# Patient Record
Sex: MALE | HISPANIC OR LATINO | Employment: FULL TIME | URBAN - METROPOLITAN AREA
[De-identification: names, ages, dates, MRNs, and addresses within clinical notes are randomized per-mention and may not be internally consistent; named-entity substitution may affect disease eponyms.]

---

## 2022-06-28 ENCOUNTER — HOSPITAL ENCOUNTER (INPATIENT)
Facility: MEDICAL CENTER | Age: 32
LOS: 4 days | DRG: 027 | End: 2022-07-03
Attending: EMERGENCY MEDICINE | Admitting: SURGERY
Payer: COMMERCIAL

## 2022-06-28 ENCOUNTER — APPOINTMENT (OUTPATIENT)
Dept: RADIOLOGY | Facility: MEDICAL CENTER | Age: 32
DRG: 027 | End: 2022-06-28
Attending: EMERGENCY MEDICINE

## 2022-06-28 DIAGNOSIS — T14.90XA TRAUMA: ICD-10-CM

## 2022-06-28 DIAGNOSIS — I62.00 SUBDURAL HEMORRHAGE (HCC): ICD-10-CM

## 2022-06-28 PROBLEM — S06.5XAA TRAUMATIC SUBDURAL HEMORRHAGE, INITIAL ENCOUNTER (HCC): Status: ACTIVE | Noted: 2022-06-28

## 2022-06-28 LAB
BASOPHILS # BLD AUTO: 0.5 % (ref 0–1.8)
BASOPHILS # BLD: 0.04 K/UL (ref 0–0.12)
EOSINOPHIL # BLD AUTO: 0.19 K/UL (ref 0–0.51)
EOSINOPHIL NFR BLD: 2.2 % (ref 0–6.9)
ERYTHROCYTE [DISTWIDTH] IN BLOOD BY AUTOMATED COUNT: 42 FL (ref 35.9–50)
HCT VFR BLD AUTO: 44 % (ref 42–52)
HGB BLD-MCNC: 14.9 G/DL (ref 14–18)
IMM GRANULOCYTES # BLD AUTO: 0.03 K/UL (ref 0–0.11)
IMM GRANULOCYTES NFR BLD AUTO: 0.3 % (ref 0–0.9)
LYMPHOCYTES # BLD AUTO: 3.43 K/UL (ref 1–4.8)
LYMPHOCYTES NFR BLD: 39.9 % (ref 22–41)
MCH RBC QN AUTO: 29.9 PG (ref 27–33)
MCHC RBC AUTO-ENTMCNC: 33.9 G/DL (ref 33.7–35.3)
MCV RBC AUTO: 88.2 FL (ref 81.4–97.8)
MONOCYTES # BLD AUTO: 0.73 K/UL (ref 0–0.85)
MONOCYTES NFR BLD AUTO: 8.5 % (ref 0–13.4)
NEUTROPHILS # BLD AUTO: 4.18 K/UL (ref 1.82–7.42)
NEUTROPHILS NFR BLD: 48.6 % (ref 44–72)
NRBC # BLD AUTO: 0 K/UL
NRBC BLD-RTO: 0 /100 WBC
PLATELET # BLD AUTO: 222 K/UL (ref 164–446)
PMV BLD AUTO: 10 FL (ref 9–12.9)
RBC # BLD AUTO: 4.99 M/UL (ref 4.7–6.1)
WBC # BLD AUTO: 8.6 K/UL (ref 4.8–10.8)

## 2022-06-28 PROCEDURE — 85576 BLOOD PLATELET AGGREGATION: CPT

## 2022-06-28 PROCEDURE — A9270 NON-COVERED ITEM OR SERVICE: HCPCS | Performed by: EMERGENCY MEDICINE

## 2022-06-28 PROCEDURE — 99291 CRITICAL CARE FIRST HOUR: CPT | Performed by: SURGERY

## 2022-06-28 PROCEDURE — 80053 COMPREHEN METABOLIC PANEL: CPT

## 2022-06-28 PROCEDURE — 36415 COLL VENOUS BLD VENIPUNCTURE: CPT

## 2022-06-28 PROCEDURE — 70450 CT HEAD/BRAIN W/O DYE: CPT

## 2022-06-28 PROCEDURE — 85347 COAGULATION TIME ACTIVATED: CPT

## 2022-06-28 PROCEDURE — 85025 COMPLETE CBC W/AUTO DIFF WBC: CPT

## 2022-06-28 PROCEDURE — 700102 HCHG RX REV CODE 250 W/ 637 OVERRIDE(OP): Performed by: EMERGENCY MEDICINE

## 2022-06-28 PROCEDURE — 85610 PROTHROMBIN TIME: CPT

## 2022-06-28 PROCEDURE — 85384 FIBRINOGEN ACTIVITY: CPT

## 2022-06-28 PROCEDURE — 99291 CRITICAL CARE FIRST HOUR: CPT

## 2022-06-28 RX ADMIN — IBUPROFEN 800 MG: 600 TABLET, FILM COATED ORAL at 22:52

## 2022-06-29 ENCOUNTER — APPOINTMENT (OUTPATIENT)
Dept: RADIOLOGY | Facility: MEDICAL CENTER | Age: 32
DRG: 027 | End: 2022-06-29
Attending: SURGERY

## 2022-06-29 ENCOUNTER — ANESTHESIA (OUTPATIENT)
Dept: SURGERY | Facility: MEDICAL CENTER | Age: 32
DRG: 027 | End: 2022-06-29
Payer: COMMERCIAL

## 2022-06-29 ENCOUNTER — ANESTHESIA EVENT (OUTPATIENT)
Dept: SURGERY | Facility: MEDICAL CENTER | Age: 32
DRG: 027 | End: 2022-06-29

## 2022-06-29 PROBLEM — S06.5XAA SUBDURAL HEMATOMA (HCC): Status: RESOLVED | Noted: 2022-06-29 | Resolved: 2022-06-29

## 2022-06-29 PROBLEM — S06.5XAA SUBDURAL HEMATOMA (HCC): Status: ACTIVE | Noted: 2022-06-29

## 2022-06-29 PROBLEM — T50.905A PLATELET DYSFUNCTION DUE TO DRUGS: Status: ACTIVE | Noted: 2022-06-29

## 2022-06-29 PROBLEM — Z53.09 CONTRAINDICATION TO DEEP VEIN THROMBOSIS (DVT) PROPHYLAXIS: Status: ACTIVE | Noted: 2022-06-29

## 2022-06-29 PROBLEM — D69.59 PLATELET DYSFUNCTION DUE TO DRUGS: Status: ACTIVE | Noted: 2022-06-29

## 2022-06-29 LAB
ABO + RH BLD: NORMAL
ABO GROUP BLD: NORMAL
ALBUMIN SERPL BCP-MCNC: 4.7 G/DL (ref 3.2–4.9)
ALBUMIN/GLOB SERPL: 1.7 G/DL
ALP SERPL-CCNC: 65 U/L (ref 30–99)
ALT SERPL-CCNC: 50 U/L (ref 2–50)
ANION GAP SERPL CALC-SCNC: 9 MMOL/L (ref 7–16)
AST SERPL-CCNC: 32 U/L (ref 12–45)
BILIRUB SERPL-MCNC: 1.3 MG/DL (ref 0.1–1.5)
BLD GP AB SCN SERPL QL: NORMAL
BUN SERPL-MCNC: 15 MG/DL (ref 8–22)
CALCIUM SERPL-MCNC: 9.2 MG/DL (ref 8.5–10.5)
CFT BLD TEG: 5.3 MIN (ref 4.6–9.1)
CFT P HPASE BLD TEG: 5.9 MIN (ref 4.3–8.3)
CHLORIDE SERPL-SCNC: 105 MMOL/L (ref 96–112)
CLOT ANGLE BLD TEG: 71 DEGREES (ref 63–78)
CLOT LYSIS 30M P MA LENFR BLD TEG: 0.7 % (ref 0–2.6)
CO2 SERPL-SCNC: 24 MMOL/L (ref 20–33)
CREAT SERPL-MCNC: 0.94 MG/DL (ref 0.5–1.4)
CT.EXTRINSIC BLD ROTEM: 1.5 MIN (ref 0.8–2.1)
GFR SERPLBLD CREATININE-BSD FMLA CKD-EPI: 111 ML/MIN/1.73 M 2
GLOBULIN SER CALC-MCNC: 2.7 G/DL (ref 1.9–3.5)
GLUCOSE SERPL-MCNC: 113 MG/DL (ref 65–99)
INR PPP: 1.02 (ref 0.87–1.13)
MCF BLD TEG: 53.8 MM (ref 52–69)
MCF.PLATELET INHIB BLD ROTEM: 16.6 MM (ref 15–32)
PA AA BLD-ACNC: 24 % (ref 0–11)
PA ADP BLD-ACNC: 20.3 % (ref 0–17)
POTASSIUM SERPL-SCNC: 3.6 MMOL/L (ref 3.6–5.5)
PROT SERPL-MCNC: 7.4 G/DL (ref 6–8.2)
PROTHROMBIN TIME: 13.1 SEC (ref 12–14.6)
RH BLD: NORMAL
SODIUM SERPL-SCNC: 138 MMOL/L (ref 135–145)
TEG ALGORITHM TGALG: ABNORMAL

## 2022-06-29 PROCEDURE — 160041 HCHG SURGERY MINUTES - EA ADDL 1 MIN LEVEL 4: Performed by: NEUROLOGICAL SURGERY

## 2022-06-29 PROCEDURE — 700101 HCHG RX REV CODE 250: Performed by: EMERGENCY MEDICINE

## 2022-06-29 PROCEDURE — 160002 HCHG RECOVERY MINUTES (STAT): Performed by: NEUROLOGICAL SURGERY

## 2022-06-29 PROCEDURE — 00C40ZZ EXTIRPATION OF MATTER FROM INTRACRANIAL SUBDURAL SPACE, OPEN APPROACH: ICD-10-PCS | Performed by: NEUROLOGICAL SURGERY

## 2022-06-29 PROCEDURE — 160035 HCHG PACU - 1ST 60 MINS PHASE I: Performed by: NEUROLOGICAL SURGERY

## 2022-06-29 PROCEDURE — A9270 NON-COVERED ITEM OR SERVICE: HCPCS | Performed by: NEUROLOGICAL SURGERY

## 2022-06-29 PROCEDURE — 36620 INSERTION CATHETER ARTERY: CPT | Performed by: EMERGENCY MEDICINE

## 2022-06-29 PROCEDURE — 700102 HCHG RX REV CODE 250 W/ 637 OVERRIDE(OP): Performed by: SURGERY

## 2022-06-29 PROCEDURE — C1713 ANCHOR/SCREW BN/BN,TIS/BN: HCPCS | Performed by: NEUROLOGICAL SURGERY

## 2022-06-29 PROCEDURE — 700105 HCHG RX REV CODE 258: Performed by: EMERGENCY MEDICINE

## 2022-06-29 PROCEDURE — 110454 HCHG SHELL REV 250: Performed by: NEUROLOGICAL SURGERY

## 2022-06-29 PROCEDURE — 86900 BLOOD TYPING SEROLOGIC ABO: CPT

## 2022-06-29 PROCEDURE — A9270 NON-COVERED ITEM OR SERVICE: HCPCS | Performed by: SURGERY

## 2022-06-29 PROCEDURE — 160048 HCHG OR STATISTICAL LEVEL 1-5: Performed by: NEUROLOGICAL SURGERY

## 2022-06-29 PROCEDURE — 700111 HCHG RX REV CODE 636 W/ 250 OVERRIDE (IP): Performed by: CLINICAL NURSE SPECIALIST

## 2022-06-29 PROCEDURE — 700102 HCHG RX REV CODE 250 W/ 637 OVERRIDE(OP): Performed by: CLINICAL NURSE SPECIALIST

## 2022-06-29 PROCEDURE — 700101 HCHG RX REV CODE 250: Performed by: NEUROLOGICAL SURGERY

## 2022-06-29 PROCEDURE — A9270 NON-COVERED ITEM OR SERVICE: HCPCS | Performed by: CLINICAL NURSE SPECIALIST

## 2022-06-29 PROCEDURE — 700111 HCHG RX REV CODE 636 W/ 250 OVERRIDE (IP): Performed by: SURGERY

## 2022-06-29 PROCEDURE — 700105 HCHG RX REV CODE 258: Performed by: SURGERY

## 2022-06-29 PROCEDURE — 86850 RBC ANTIBODY SCREEN: CPT

## 2022-06-29 PROCEDURE — 160029 HCHG SURGERY MINUTES - 1ST 30 MINS LEVEL 4: Performed by: NEUROLOGICAL SURGERY

## 2022-06-29 PROCEDURE — 770022 HCHG ROOM/CARE - ICU (200)

## 2022-06-29 PROCEDURE — 00211 ANES ICR PX CRNEC/CRNOT HMTM: CPT | Performed by: EMERGENCY MEDICINE

## 2022-06-29 PROCEDURE — 86901 BLOOD TYPING SEROLOGIC RH(D): CPT

## 2022-06-29 PROCEDURE — 160009 HCHG ANES TIME/MIN: Performed by: NEUROLOGICAL SURGERY

## 2022-06-29 PROCEDURE — 700111 HCHG RX REV CODE 636 W/ 250 OVERRIDE (IP): Performed by: EMERGENCY MEDICINE

## 2022-06-29 PROCEDURE — 700102 HCHG RX REV CODE 250 W/ 637 OVERRIDE(OP): Performed by: NEUROLOGICAL SURGERY

## 2022-06-29 PROCEDURE — 03HY32Z INSERTION OF MONITORING DEVICE INTO UPPER ARTERY, PERCUTANEOUS APPROACH: ICD-10-PCS | Performed by: EMERGENCY MEDICINE

## 2022-06-29 PROCEDURE — 700111 HCHG RX REV CODE 636 W/ 250 OVERRIDE (IP): Performed by: NEUROLOGICAL SURGERY

## 2022-06-29 PROCEDURE — 99233 SBSQ HOSP IP/OBS HIGH 50: CPT | Performed by: SURGERY

## 2022-06-29 DEVICE — IMPLANTABLE DEVICE: Type: IMPLANTABLE DEVICE | Site: CRANIAL | Status: FUNCTIONAL

## 2022-06-29 DEVICE — GRAFT DURAMATRIX ONLAY PLUS 3IN X 3IN OR 7.5CM X 7.5CM: Type: IMPLANTABLE DEVICE | Site: CRANIAL | Status: FUNCTIONAL

## 2022-06-29 DEVICE — PLATE NC DOGBONE 2-HOLE W/O TAB (6NCX4=24): Type: IMPLANTABLE DEVICE | Site: CRANIAL | Status: FUNCTIONAL

## 2022-06-29 DEVICE — PLATE NC BURR HOLE COVER FOR SHUNT 14MM (6NCX6=36): Type: IMPLANTABLE DEVICE | Site: CRANIAL | Status: FUNCTIONAL

## 2022-06-29 DEVICE — SCREW STRYK NC 1.5X5MM (6NCX40=240) (80EA/PK) CONSIGNED QTY 240 PRE-LOAD: Type: IMPLANTABLE DEVICE | Site: CRANIAL | Status: FUNCTIONAL

## 2022-06-29 RX ORDER — DIPHENHYDRAMINE HYDROCHLORIDE 50 MG/ML
12.5 INJECTION INTRAMUSCULAR; INTRAVENOUS
Status: DISCONTINUED | OUTPATIENT
Start: 2022-06-29 | End: 2022-06-29 | Stop reason: HOSPADM

## 2022-06-29 RX ORDER — MIDAZOLAM HYDROCHLORIDE 1 MG/ML
INJECTION INTRAMUSCULAR; INTRAVENOUS PRN
Status: DISCONTINUED | OUTPATIENT
Start: 2022-06-29 | End: 2022-06-29 | Stop reason: SURG

## 2022-06-29 RX ORDER — ACETAMINOPHEN 500 MG
1000 TABLET ORAL EVERY 6 HOURS PRN
Status: DISCONTINUED | OUTPATIENT
Start: 2022-07-04 | End: 2022-06-29

## 2022-06-29 RX ORDER — ONDANSETRON 2 MG/ML
INJECTION INTRAMUSCULAR; INTRAVENOUS PRN
Status: DISCONTINUED | OUTPATIENT
Start: 2022-06-29 | End: 2022-06-29 | Stop reason: SURG

## 2022-06-29 RX ORDER — CEFAZOLIN SODIUM 2 G/100ML
2 INJECTION, SOLUTION INTRAVENOUS EVERY 8 HOURS
Status: COMPLETED | OUTPATIENT
Start: 2022-06-29 | End: 2022-06-30

## 2022-06-29 RX ORDER — ACETAMINOPHEN 500 MG
1000 TABLET ORAL EVERY 6 HOURS
Status: DISCONTINUED | OUTPATIENT
Start: 2022-06-29 | End: 2022-06-29

## 2022-06-29 RX ORDER — HYDRALAZINE HYDROCHLORIDE 20 MG/ML
10 INJECTION INTRAMUSCULAR; INTRAVENOUS
Status: DISCONTINUED | OUTPATIENT
Start: 2022-06-29 | End: 2022-07-02

## 2022-06-29 RX ORDER — DOCUSATE SODIUM 100 MG/1
100 CAPSULE, LIQUID FILLED ORAL 2 TIMES DAILY
Status: DISCONTINUED | OUTPATIENT
Start: 2022-06-29 | End: 2022-07-03 | Stop reason: HOSPADM

## 2022-06-29 RX ORDER — HYDROMORPHONE HYDROCHLORIDE 1 MG/ML
0.25 INJECTION, SOLUTION INTRAMUSCULAR; INTRAVENOUS; SUBCUTANEOUS
Status: DISCONTINUED | OUTPATIENT
Start: 2022-06-29 | End: 2022-06-29

## 2022-06-29 RX ORDER — ROCURONIUM BROMIDE 10 MG/ML
INJECTION, SOLUTION INTRAVENOUS PRN
Status: DISCONTINUED | OUTPATIENT
Start: 2022-06-29 | End: 2022-06-29 | Stop reason: SURG

## 2022-06-29 RX ORDER — LABETALOL HYDROCHLORIDE 5 MG/ML
10 INJECTION, SOLUTION INTRAVENOUS
Status: DISCONTINUED | OUTPATIENT
Start: 2022-06-29 | End: 2022-07-02

## 2022-06-29 RX ORDER — MEPERIDINE HYDROCHLORIDE 25 MG/ML
6.25 INJECTION INTRAMUSCULAR; INTRAVENOUS; SUBCUTANEOUS
Status: DISCONTINUED | OUTPATIENT
Start: 2022-06-29 | End: 2022-06-29 | Stop reason: HOSPADM

## 2022-06-29 RX ORDER — HYDROMORPHONE HYDROCHLORIDE 1 MG/ML
0.2 INJECTION, SOLUTION INTRAMUSCULAR; INTRAVENOUS; SUBCUTANEOUS
Status: DISCONTINUED | OUTPATIENT
Start: 2022-06-29 | End: 2022-06-29 | Stop reason: HOSPADM

## 2022-06-29 RX ORDER — OXYCODONE HYDROCHLORIDE AND ACETAMINOPHEN 5; 325 MG/1; MG/1
2 TABLET ORAL
Status: DISCONTINUED | OUTPATIENT
Start: 2022-06-29 | End: 2022-06-29 | Stop reason: HOSPADM

## 2022-06-29 RX ORDER — LEVETIRACETAM 500 MG/1
500 TABLET ORAL EVERY 12 HOURS
Status: DISCONTINUED | OUTPATIENT
Start: 2022-06-29 | End: 2022-07-03 | Stop reason: HOSPADM

## 2022-06-29 RX ORDER — ACETAMINOPHEN 500 MG
1000 TABLET ORAL EVERY 6 HOURS
Status: DISCONTINUED | OUTPATIENT
Start: 2022-06-29 | End: 2022-07-03 | Stop reason: HOSPADM

## 2022-06-29 RX ORDER — ENEMA 19; 7 G/133ML; G/133ML
1 ENEMA RECTAL
Status: DISCONTINUED | OUTPATIENT
Start: 2022-06-29 | End: 2022-07-03 | Stop reason: HOSPADM

## 2022-06-29 RX ORDER — ONDANSETRON 2 MG/ML
4 INJECTION INTRAMUSCULAR; INTRAVENOUS
Status: DISCONTINUED | OUTPATIENT
Start: 2022-06-29 | End: 2022-06-29 | Stop reason: HOSPADM

## 2022-06-29 RX ORDER — OXYCODONE HYDROCHLORIDE 5 MG/1
5 TABLET ORAL
Status: DISCONTINUED | OUTPATIENT
Start: 2022-06-29 | End: 2022-06-29

## 2022-06-29 RX ORDER — HALOPERIDOL 5 MG/ML
1 INJECTION INTRAMUSCULAR
Status: DISCONTINUED | OUTPATIENT
Start: 2022-06-29 | End: 2022-06-29 | Stop reason: HOSPADM

## 2022-06-29 RX ORDER — LEVETIRACETAM 500 MG/1
500 TABLET ORAL EVERY 12 HOURS
Status: DISCONTINUED | OUTPATIENT
Start: 2022-06-29 | End: 2022-06-29

## 2022-06-29 RX ORDER — DEXAMETHASONE SODIUM PHOSPHATE 4 MG/ML
INJECTION, SOLUTION INTRA-ARTICULAR; INTRALESIONAL; INTRAMUSCULAR; INTRAVENOUS; SOFT TISSUE PRN
Status: DISCONTINUED | OUTPATIENT
Start: 2022-06-29 | End: 2022-06-29 | Stop reason: SURG

## 2022-06-29 RX ORDER — BUPIVACAINE HYDROCHLORIDE AND EPINEPHRINE 5; 5 MG/ML; UG/ML
INJECTION, SOLUTION EPIDURAL; INTRACAUDAL; PERINEURAL
Status: DISCONTINUED | OUTPATIENT
Start: 2022-06-29 | End: 2022-06-29

## 2022-06-29 RX ORDER — CEFAZOLIN SODIUM 1 G/3ML
INJECTION, POWDER, FOR SOLUTION INTRAMUSCULAR; INTRAVENOUS
Status: DISCONTINUED | OUTPATIENT
Start: 2022-06-29 | End: 2022-06-29

## 2022-06-29 RX ORDER — SODIUM CHLORIDE, SODIUM LACTATE, POTASSIUM CHLORIDE, AND CALCIUM CHLORIDE .6; .31; .03; .02 G/100ML; G/100ML; G/100ML; G/100ML
500 INJECTION, SOLUTION INTRAVENOUS ONCE
Status: DISCONTINUED | OUTPATIENT
Start: 2022-06-29 | End: 2022-06-29 | Stop reason: HOSPADM

## 2022-06-29 RX ORDER — CEFAZOLIN SODIUM 1 G/3ML
INJECTION, POWDER, FOR SOLUTION INTRAMUSCULAR; INTRAVENOUS PRN
Status: DISCONTINUED | OUTPATIENT
Start: 2022-06-29 | End: 2022-06-29 | Stop reason: SURG

## 2022-06-29 RX ORDER — ALBUTEROL SULFATE 2.5 MG/3ML
2.5 SOLUTION RESPIRATORY (INHALATION)
Status: DISCONTINUED | OUTPATIENT
Start: 2022-06-29 | End: 2022-06-29 | Stop reason: HOSPADM

## 2022-06-29 RX ORDER — OXYCODONE HYDROCHLORIDE 5 MG/1
2.5 TABLET ORAL
Status: DISCONTINUED | OUTPATIENT
Start: 2022-06-29 | End: 2022-07-03 | Stop reason: HOSPADM

## 2022-06-29 RX ORDER — ONDANSETRON 2 MG/ML
4 INJECTION INTRAMUSCULAR; INTRAVENOUS EVERY 4 HOURS PRN
Status: DISCONTINUED | OUTPATIENT
Start: 2022-06-29 | End: 2022-07-03 | Stop reason: HOSPADM

## 2022-06-29 RX ORDER — LEVETIRACETAM 500 MG/5ML
INJECTION, SOLUTION, CONCENTRATE INTRAVENOUS PRN
Status: DISCONTINUED | OUTPATIENT
Start: 2022-06-29 | End: 2022-06-29 | Stop reason: SURG

## 2022-06-29 RX ORDER — BISACODYL 10 MG
10 SUPPOSITORY, RECTAL RECTAL
Status: DISCONTINUED | OUTPATIENT
Start: 2022-06-29 | End: 2022-07-03 | Stop reason: HOSPADM

## 2022-06-29 RX ORDER — LEVETIRACETAM 500 MG/5ML
500 INJECTION, SOLUTION, CONCENTRATE INTRAVENOUS EVERY 12 HOURS
Status: DISCONTINUED | OUTPATIENT
Start: 2022-06-29 | End: 2022-07-03 | Stop reason: HOSPADM

## 2022-06-29 RX ORDER — OXYCODONE HYDROCHLORIDE AND ACETAMINOPHEN 5; 325 MG/1; MG/1
1 TABLET ORAL
Status: DISCONTINUED | OUTPATIENT
Start: 2022-06-29 | End: 2022-06-29 | Stop reason: HOSPADM

## 2022-06-29 RX ORDER — OXYCODONE HYDROCHLORIDE 5 MG/1
2.5 TABLET ORAL
Status: DISCONTINUED | OUTPATIENT
Start: 2022-06-29 | End: 2022-06-29

## 2022-06-29 RX ORDER — FAMOTIDINE 20 MG/1
20 TABLET, FILM COATED ORAL 2 TIMES DAILY
Status: DISCONTINUED | OUTPATIENT
Start: 2022-06-29 | End: 2022-07-02

## 2022-06-29 RX ORDER — LABETALOL HYDROCHLORIDE 5 MG/ML
5 INJECTION, SOLUTION INTRAVENOUS
Status: DISCONTINUED | OUTPATIENT
Start: 2022-06-29 | End: 2022-06-29 | Stop reason: HOSPADM

## 2022-06-29 RX ORDER — LEVETIRACETAM 500 MG/5ML
500 INJECTION, SOLUTION, CONCENTRATE INTRAVENOUS EVERY 12 HOURS
Status: DISCONTINUED | OUTPATIENT
Start: 2022-06-29 | End: 2022-06-29

## 2022-06-29 RX ORDER — HYDROMORPHONE HYDROCHLORIDE 2 MG/ML
INJECTION, SOLUTION INTRAMUSCULAR; INTRAVENOUS; SUBCUTANEOUS PRN
Status: DISCONTINUED | OUTPATIENT
Start: 2022-06-29 | End: 2022-06-29 | Stop reason: SURG

## 2022-06-29 RX ORDER — ACETAMINOPHEN 500 MG
1000 TABLET ORAL EVERY 6 HOURS PRN
Status: DISCONTINUED | OUTPATIENT
Start: 2022-07-04 | End: 2022-07-03 | Stop reason: HOSPADM

## 2022-06-29 RX ORDER — CLONIDINE HYDROCHLORIDE 0.1 MG/1
0.1 TABLET ORAL EVERY 4 HOURS PRN
Status: DISCONTINUED | OUTPATIENT
Start: 2022-06-29 | End: 2022-07-02

## 2022-06-29 RX ORDER — POLYETHYLENE GLYCOL 3350 17 G/17G
1 POWDER, FOR SOLUTION ORAL 2 TIMES DAILY
Status: DISCONTINUED | OUTPATIENT
Start: 2022-06-29 | End: 2022-07-03 | Stop reason: HOSPADM

## 2022-06-29 RX ORDER — OXYCODONE HYDROCHLORIDE 5 MG/1
5 TABLET ORAL
Status: DISCONTINUED | OUTPATIENT
Start: 2022-06-29 | End: 2022-07-03 | Stop reason: HOSPADM

## 2022-06-29 RX ORDER — HYDROMORPHONE HYDROCHLORIDE 1 MG/ML
0.1 INJECTION, SOLUTION INTRAMUSCULAR; INTRAVENOUS; SUBCUTANEOUS
Status: DISCONTINUED | OUTPATIENT
Start: 2022-06-29 | End: 2022-06-29 | Stop reason: HOSPADM

## 2022-06-29 RX ORDER — ONDANSETRON 4 MG/1
4 TABLET, ORALLY DISINTEGRATING ORAL EVERY 4 HOURS PRN
Status: DISCONTINUED | OUTPATIENT
Start: 2022-06-29 | End: 2022-07-03 | Stop reason: HOSPADM

## 2022-06-29 RX ORDER — SODIUM CHLORIDE 9 MG/ML
INJECTION, SOLUTION INTRAVENOUS CONTINUOUS
Status: DISCONTINUED | OUTPATIENT
Start: 2022-06-29 | End: 2022-06-30

## 2022-06-29 RX ORDER — HYDROMORPHONE HYDROCHLORIDE 1 MG/ML
0.5 INJECTION, SOLUTION INTRAMUSCULAR; INTRAVENOUS; SUBCUTANEOUS
Status: DISCONTINUED | OUTPATIENT
Start: 2022-06-29 | End: 2022-07-03

## 2022-06-29 RX ORDER — HYDROMORPHONE HYDROCHLORIDE 1 MG/ML
0.4 INJECTION, SOLUTION INTRAMUSCULAR; INTRAVENOUS; SUBCUTANEOUS
Status: DISCONTINUED | OUTPATIENT
Start: 2022-06-29 | End: 2022-06-29 | Stop reason: HOSPADM

## 2022-06-29 RX ORDER — SODIUM CHLORIDE, SODIUM LACTATE, POTASSIUM CHLORIDE, CALCIUM CHLORIDE 600; 310; 30; 20 MG/100ML; MG/100ML; MG/100ML; MG/100ML
INJECTION, SOLUTION INTRAVENOUS
Status: DISCONTINUED | OUTPATIENT
Start: 2022-06-29 | End: 2022-06-29 | Stop reason: SURG

## 2022-06-29 RX ORDER — HYDRALAZINE HYDROCHLORIDE 20 MG/ML
5 INJECTION INTRAMUSCULAR; INTRAVENOUS
Status: DISCONTINUED | OUTPATIENT
Start: 2022-06-29 | End: 2022-06-29 | Stop reason: HOSPADM

## 2022-06-29 RX ADMIN — CEFAZOLIN 2 G: 330 INJECTION, POWDER, FOR SOLUTION INTRAMUSCULAR; INTRAVENOUS at 09:55

## 2022-06-29 RX ADMIN — CEFAZOLIN SODIUM 2 G: 2 INJECTION, SOLUTION INTRAVENOUS at 18:08

## 2022-06-29 RX ADMIN — LEVETIRACETAM 1000 MG: 100 INJECTION, SOLUTION INTRAVENOUS at 10:30

## 2022-06-29 RX ADMIN — ACETAMINOPHEN 1000 MG: 500 TABLET, FILM COATED ORAL at 17:28

## 2022-06-29 RX ADMIN — ROCURONIUM BROMIDE 100 MG: 10 INJECTION, SOLUTION INTRAVENOUS at 09:35

## 2022-06-29 RX ADMIN — SUGAMMADEX 400 MG: 100 INJECTION, SOLUTION INTRAVENOUS at 11:47

## 2022-06-29 RX ADMIN — SODIUM CHLORIDE: 9 INJECTION, SOLUTION INTRAVENOUS at 15:17

## 2022-06-29 RX ADMIN — PROPOFOL 50 MG: 10 INJECTION, EMULSION INTRAVENOUS at 11:47

## 2022-06-29 RX ADMIN — DOCUSATE SODIUM 100 MG: 100 CAPSULE, LIQUID FILLED ORAL at 17:29

## 2022-06-29 RX ADMIN — LEVETIRACETAM 500 MG: 100 INJECTION, SOLUTION INTRAVENOUS at 02:25

## 2022-06-29 RX ADMIN — LEVETIRACETAM 500 MG: 500 TABLET, FILM COATED ORAL at 17:29

## 2022-06-29 RX ADMIN — HYDROMORPHONE HYDROCHLORIDE 1 MG: 2 INJECTION INTRAMUSCULAR; INTRAVENOUS; SUBCUTANEOUS at 11:47

## 2022-06-29 RX ADMIN — FAMOTIDINE 20 MG: 10 INJECTION, SOLUTION INTRAVENOUS at 05:33

## 2022-06-29 RX ADMIN — FENTANYL CITRATE 150 MCG: 50 INJECTION, SOLUTION INTRAMUSCULAR; INTRAVENOUS at 10:05

## 2022-06-29 RX ADMIN — SODIUM CHLORIDE: 9 INJECTION, SOLUTION INTRAVENOUS at 00:36

## 2022-06-29 RX ADMIN — PROPOFOL 50 MG: 10 INJECTION, EMULSION INTRAVENOUS at 11:34

## 2022-06-29 RX ADMIN — FENTANYL CITRATE 100 MCG: 50 INJECTION, SOLUTION INTRAMUSCULAR; INTRAVENOUS at 09:34

## 2022-06-29 RX ADMIN — ACETAMINOPHEN 1000 MG: 500 TABLET ORAL at 06:00

## 2022-06-29 RX ADMIN — FENTANYL CITRATE 100 MCG: 50 INJECTION, SOLUTION INTRAMUSCULAR; INTRAVENOUS at 10:47

## 2022-06-29 RX ADMIN — DEXAMETHASONE SODIUM PHOSPHATE 4 MG: 4 INJECTION, SOLUTION INTRA-ARTICULAR; INTRALESIONAL; INTRAMUSCULAR; INTRAVENOUS; SOFT TISSUE at 10:10

## 2022-06-29 RX ADMIN — SODIUM CHLORIDE, POTASSIUM CHLORIDE, SODIUM LACTATE AND CALCIUM CHLORIDE: 600; 310; 30; 20 INJECTION, SOLUTION INTRAVENOUS at 09:26

## 2022-06-29 RX ADMIN — POLYETHYLENE GLYCOL 3350 1 PACKET: 17 POWDER, FOR SOLUTION ORAL at 17:28

## 2022-06-29 RX ADMIN — ONDANSETRON 4 MG: 2 INJECTION INTRAMUSCULAR; INTRAVENOUS at 11:35

## 2022-06-29 RX ADMIN — MIDAZOLAM HYDROCHLORIDE 2 MG: 1 INJECTION, SOLUTION INTRAMUSCULAR; INTRAVENOUS at 09:30

## 2022-06-29 RX ADMIN — FAMOTIDINE 20 MG: 20 TABLET ORAL at 17:29

## 2022-06-29 RX ADMIN — PROPOFOL 50 MG: 10 INJECTION, EMULSION INTRAVENOUS at 11:40

## 2022-06-29 ASSESSMENT — COPD QUESTIONNAIRES
DO YOU EVER COUGH UP ANY MUCUS OR PHLEGM?: NO/ONLY WITH OCCASIONAL COLDS OR INFECTIONS
HAVE YOU SMOKED AT LEAST 100 CIGARETTES IN YOUR ENTIRE LIFE: NO/DON'T KNOW
COPD SCREENING SCORE: 0
DURING THE PAST 4 WEEKS HOW MUCH DID YOU FEEL SHORT OF BREATH: NONE/LITTLE OF THE TIME

## 2022-06-29 ASSESSMENT — COGNITIVE AND FUNCTIONAL STATUS - GENERAL
WALKING IN HOSPITAL ROOM: A LITTLE
SUGGESTED CMS G CODE MODIFIER DAILY ACTIVITY: CH
DAILY ACTIVITIY SCORE: 24
SUGGESTED CMS G CODE MODIFIER MOBILITY: CJ
CLIMB 3 TO 5 STEPS WITH RAILING: A LITTLE
MOBILITY SCORE: 22

## 2022-06-29 ASSESSMENT — ENCOUNTER SYMPTOMS
VOMITING: 0
HEADACHES: 1
NAUSEA: 0
MUSCULOSKELETAL NEGATIVE: 1
PSYCHIATRIC NEGATIVE: 1
FEVER: 0
SHORTNESS OF BREATH: 0
DOUBLE VISION: 0
FOCAL WEAKNESS: 0
CHILLS: 0
SENSORY CHANGE: 0
SPEECH CHANGE: 0
ABDOMINAL PAIN: 0

## 2022-06-29 ASSESSMENT — LIFESTYLE VARIABLES
EVER HAD A DRINK FIRST THING IN THE MORNING TO STEADY YOUR NERVES TO GET RID OF A HANGOVER: NO
HAVE PEOPLE ANNOYED YOU BY CRITICIZING YOUR DRINKING: NO
ON A TYPICAL DAY WHEN YOU DRINK ALCOHOL HOW MANY DRINKS DO YOU HAVE: 3
TOTAL SCORE: 0
HAVE YOU EVER FELT YOU SHOULD CUT DOWN ON YOUR DRINKING: NO
TOTAL SCORE: 0
HOW MANY TIMES IN THE PAST YEAR HAVE YOU HAD 5 OR MORE DRINKS IN A DAY: 2
ALCOHOL_USE: YES
CONSUMPTION TOTAL: POSITIVE
AVERAGE NUMBER OF DAYS PER WEEK YOU HAVE A DRINK CONTAINING ALCOHOL: 1
TOTAL SCORE: 0
DOES PATIENT WANT TO STOP DRINKING: NO
EVER FELT BAD OR GUILTY ABOUT YOUR DRINKING: NO

## 2022-06-29 ASSESSMENT — PAIN DESCRIPTION - PAIN TYPE
TYPE: ACUTE PAIN

## 2022-06-29 ASSESSMENT — PATIENT HEALTH QUESTIONNAIRE - PHQ9
2. FEELING DOWN, DEPRESSED, IRRITABLE, OR HOPELESS: NOT AT ALL
2. FEELING DOWN, DEPRESSED, IRRITABLE, OR HOPELESS: NOT AT ALL
SUM OF ALL RESPONSES TO PHQ9 QUESTIONS 1 AND 2: 0
1. LITTLE INTEREST OR PLEASURE IN DOING THINGS: NOT AT ALL
1. LITTLE INTEREST OR PLEASURE IN DOING THINGS: NOT AT ALL
SUM OF ALL RESPONSES TO PHQ9 QUESTIONS 1 AND 2: 0

## 2022-06-29 ASSESSMENT — PAIN SCALES - GENERAL: PAIN_LEVEL: 2

## 2022-06-29 ASSESSMENT — FIBROSIS 4 INDEX: FIB4 SCORE: 0.63

## 2022-06-29 NOTE — ED TRIAGE NOTES
"  Chief Complaint   Patient presents with   • Headache     X 1 week on and off     Pt to triage for above complaint. Pt reports headaches on and off x 1 week with varying degrees of severity. Denies photosensitivity, N/V, or visual changes. Rates current headache at 2/10. Had been taking ibuprofen at home with some relief.      Pt is alert/oriented, following commands, and answering questions appropriately. No acute distress noted in triage and respirations are even and unlabored.   Pt placed in lobby and educated on triage process. Pt encouraged to alert staff for any changes in condition.    ./85   Pulse 75   Temp 36.4 °C (97.6 °F) (Temporal)   Resp 18   Ht 1.778 m (5' 10\")   Wt 111 kg (244 lb 14.9 oz)   SpO2 99%   BMI 35.14 kg/m²     "

## 2022-06-29 NOTE — ANESTHESIA TIME REPORT
Anesthesia Start and Stop Event Times     Date Time Event    6/29/2022 0924 Ready for Procedure     0926 Anesthesia Start     1155 Anesthesia Stop        Responsible Staff  06/29/22    Name Role Begin End    Aldo Urbano M.D. Anesth 0926 1155        Overtime Reason:  no overtime (within assigned shift)    Comments:

## 2022-06-29 NOTE — ANESTHESIA PREPROCEDURE EVALUATION
Case: [id number] Date/Time: 06/29/22 0915    Procedure: CRANIOTOMY FOR SUBDURAL HEMATOMA (Left )    Anesthesia type: General    Location: [facility] OR 06 / SURGERY [facility]    Surgeons: Kirby Degroot M.D.          Relevant Problems   No relevant active problems       Physical Exam    Airway   Mallampati: II  TM distance: >3 FB  Neck ROM: full       Cardiovascular - normal exam  Rhythm: regular  Rate: normal  (-) murmur     Dental - normal exam        Facial Hair   Pulmonary - normal exam  Breath sounds clear to auscultation     Abdominal   (+) obese     Neurological - normal exam                 Anesthesia Plan    ASA 2       Plan - general       Airway plan will be ETT    (Subdural hematoma with midline shift)      Induction: intravenous    Postoperative Plan: Postoperative administration of opioids is intended.    Pertinent diagnostic labs and testing reviewed    Informed Consent:    Anesthetic plan and risks discussed with patient.    Use of blood products discussed with: patient whom consented to blood products.

## 2022-06-29 NOTE — OP REPORT
DATE OF SERVICE:  06/29/2022     PREOPERATIVE DIAGNOSIS:  Large left-sided holohemispheric subacute subdural   hematoma.     POSTOPERATIVE DIAGNOSIS:  Large left-sided holohemispheric subacute subdural   hematoma.     PROCEDURE:  Left-sided craniotomy greater than 10 cm for evacuation of   subdural hematoma.     SURGEON:  Kirby Degroot MD     ASSISTANT:  BRIAN Chen     ANESTHESIA:  General.     COMPLICATIONS:  None.     ESTIMATED BLOOD LOSS:  50 mL.     DESCRIPTION OF PROCEDURE:  The patient was brought to the operating room,   identified in the usual fashion.  General endotracheal anesthesia was induced   by the anesthesia team.  The patient was then placed supine on the operating   room table.  All pressure points meticulously padded.  Head was turned toward   the right.  A bump was placed under his left shoulder.  This allowed us to   expose the left frontotemporal area.  Surgical clippers were used to clip the   patient's hair.  We then marked midline and then marked a linear incision   perpendicular to midline.  The patient was then prepped and draped in the   usual sterile fashion.  Local anesthesia was infiltrated in subcutaneous   tissue.  A 10 blade was used to incise the skin.  Dissection was carried down   to bone using Bovie electrocautery.  We then undermined temporalis muscle and   fascia using Bovie.  Retractors were adjusted.  We then placed 2 bur holes,   one superiorly and inferiorly and then  the dura from the overlying   bone using Penfield 1 and 3.  We then turned a standard craniotomy flap.    Antibiotic irrigation was used to ____ the flap. We then used FloSeal gentle   tamponade at the edges of the dura and the bone.  Copious amounts of   antibiotic irrigation were used to wash out the wound.  We then opened the   dura in a cruciate fashion.  It was under significant tension.  As soon as we   opened a small portion of the dura, subacute subdural fluid came in under    pressure.  We then opened all the dural leaflets with Metzenbaum scissors.    They were tacked back using 4-0 Nurolon sutures.  We then went meticulously   around the entire cavity in the subdural space to identify the membranes and   separate them from the rolanda.  This was very easily done in all locations.  We   then bipolared the subdural membranes up against the inner surface of the   dura.  We had excellent decompression.  Copious amounts of antibiotic   irrigation were used to wash out the subdural space.  Once the effluent ran   completely clear and we had removed all the membranes that we were able to   see, we then left a subdural STEVEN, brought out through a separate stab incision.    Dural leaflets were reapproximated with 4-0 Nurolon.  Duragen was placed   over the durotomy repair.  We then placed the bone flap back with titanium   plates and screws and then temporalis muscle and fascia were closed with 0   Vicryl.  Galea was closed with 0 Vicryl inverted.  Skin was closed with   staples and a sterile dressing.  I was present and scrubbed for the entire   procedure.  There were no complications.        ______________________________  MD AMITA Linares/ANTONIO/TAMIKA    DD:  06/29/2022 12:47  DT:  06/29/2022 13:03    Job#:  [id number]

## 2022-06-29 NOTE — PROGRESS NOTES
Trauma / Surgical Daily Progress Note    Date of Service  6/29/2022    Chief Complaint  32 y.o. male admitted 6/28/2022 with headaches.     Interval Events    Status post left-sided craniotomy for evacuation of subdural hematoma.  GCS 15 with no focal neurological deficits.   Subdural drain with bloody drainage.     Review of Systems  Review of Systems   Constitutional: Negative for chills and fever.   HENT: Negative for hearing loss.    Eyes: Negative for double vision.   Respiratory: Negative for shortness of breath.    Cardiovascular: Negative for chest pain.   Gastrointestinal: Negative for abdominal pain, nausea and vomiting.   Genitourinary: Negative for dysuria.   Musculoskeletal: Negative.    Neurological: Positive for headaches (Improved post operatively. ). Negative for sensory change, speech change and focal weakness.   Psychiatric/Behavioral: Negative.         Vital Signs  Temp:  [36.1 °C (97 °F)-36.6 °C (97.8 °F)] 36.2 °C (97.2 °F)  Pulse:  [] 60  Resp:  [] 19  BP: (109-150)/() 116/75  SpO2:  [93 %-100 %] 98 %    Physical Exam  Physical Exam  Vitals and nursing note reviewed.   Constitutional:       General: He is awake. He is not in acute distress.     Appearance: He is not ill-appearing, toxic-appearing or diaphoretic.   HENT:      Head:      Comments: Postoperative dressing in place.  Subdural drain with bloody drainage.     Nose: No congestion.      Mouth/Throat:      Mouth: Mucous membranes are moist.      Pharynx: No oropharyngeal exudate.   Eyes:      General: No scleral icterus.     Extraocular Movements: Extraocular movements intact.      Conjunctiva/sclera: Conjunctivae normal.      Pupils: Pupils are equal, round, and reactive to light.   Cardiovascular:      Rate and Rhythm: Normal rate and regular rhythm.      Pulses: Normal pulses.   Pulmonary:      Effort: Pulmonary effort is normal.      Breath sounds: Normal breath sounds.   Chest:      Chest wall: No tenderness.    Abdominal:      General: There is no distension.      Tenderness: There is no abdominal tenderness. There is no guarding.   Genitourinary:     Comments: Fuchs catheter  Musculoskeletal:         General: Normal range of motion.      Cervical back: No tenderness.   Skin:     General: Skin is warm and dry.      Capillary Refill: Capillary refill takes less than 2 seconds.   Neurological:      Mental Status: He is alert.      GCS: GCS eye subscore is 4. GCS verbal subscore is 5. GCS motor subscore is 6.   Psychiatric:         Mood and Affect: Mood normal.         Behavior: Behavior normal. Behavior is cooperative.         Laboratory  Recent Results (from the past 24 hour(s))   CBC WITH DIFFERENTIAL    Collection Time: 06/28/22 11:19 PM   Result Value Ref Range    WBC 8.6 4.8 - 10.8 K/uL    RBC 4.99 4.70 - 6.10 M/uL    Hemoglobin 14.9 14.0 - 18.0 g/dL    Hematocrit 44.0 42.0 - 52.0 %    MCV 88.2 81.4 - 97.8 fL    MCH 29.9 27.0 - 33.0 pg    MCHC 33.9 33.7 - 35.3 g/dL    RDW 42.0 35.9 - 50.0 fL    Platelet Count 222 164 - 446 K/uL    MPV 10.0 9.0 - 12.9 fL    Neutrophils-Polys 48.60 44.00 - 72.00 %    Lymphocytes 39.90 22.00 - 41.00 %    Monocytes 8.50 0.00 - 13.40 %    Eosinophils 2.20 0.00 - 6.90 %    Basophils 0.50 0.00 - 1.80 %    Immature Granulocytes 0.30 0.00 - 0.90 %    Nucleated RBC 0.00 /100 WBC    Neutrophils (Absolute) 4.18 1.82 - 7.42 K/uL    Lymphs (Absolute) 3.43 1.00 - 4.80 K/uL    Monos (Absolute) 0.73 0.00 - 0.85 K/uL    Eos (Absolute) 0.19 0.00 - 0.51 K/uL    Baso (Absolute) 0.04 0.00 - 0.12 K/uL    Immature Granulocytes (abs) 0.03 0.00 - 0.11 K/uL    NRBC (Absolute) 0.00 K/uL   COMP METABOLIC PANEL    Collection Time: 06/28/22 11:19 PM   Result Value Ref Range    Sodium 138 135 - 145 mmol/L    Potassium 3.6 3.6 - 5.5 mmol/L    Chloride 105 96 - 112 mmol/L    Co2 24 20 - 33 mmol/L    Anion Gap 9.0 7.0 - 16.0    Glucose 113 (H) 65 - 99 mg/dL    Bun 15 8 - 22 mg/dL    Creatinine 0.94 0.50 - 1.40 mg/dL     Calcium 9.2 8.5 - 10.5 mg/dL    AST(SGOT) 32 12 - 45 U/L    ALT(SGPT) 50 2 - 50 U/L    Alkaline Phosphatase 65 30 - 99 U/L    Total Bilirubin 1.3 0.1 - 1.5 mg/dL    Albumin 4.7 3.2 - 4.9 g/dL    Total Protein 7.4 6.0 - 8.2 g/dL    Globulin 2.7 1.9 - 3.5 g/dL    A-G Ratio 1.7 g/dL   PLATELET MAPPING WITH BASIC TEG    Collection Time: 06/28/22 11:19 PM   Result Value Ref Range    Reaction Time Initial-R 5.3 4.6 - 9.1 min    React Time Initial Hep 5.9 4.3 - 8.3 min    Clot Kinetics-K 1.5 0.8 - 2.1 min    Clot Angle-Angle 71.0 63.0 - 78.0 degrees    Maximum Clot Strength-MA 53.8 52.0 - 69.0 mm    TEG Functional Fibrinogen(MA) 16.6 15.0 - 32.0 mm    Lysis 30 minutes-LY30 0.7 0.0 - 2.6 %    % Inhibition ADP 20.3 (H) 0.0 - 17.0 %    % Inhibition AA 24.0 (H) 0.0 - 11.0 %    TEG Algorithm Link Algorithm    ESTIMATED GFR    Collection Time: 06/28/22 11:19 PM   Result Value Ref Range    GFR (CKD-EPI) 111 >60 mL/min/1.73 m 2   ABO Rh Confirm    Collection Time: 06/28/22 11:19 PM   Result Value Ref Range    ABO Rh Confirm A POS    PROTHROMBIN TIME    Collection Time: 06/28/22 11:35 PM   Result Value Ref Range    PT 13.1 12.0 - 14.6 sec    INR 1.02 0.87 - 1.13   COD - Adult (Type and Screen)    Collection Time: 06/29/22 10:02 AM   Result Value Ref Range    ABO Grouping Only A     Rh Grouping Only POS     Antibody Screen-Cod NEG        Fluids    Intake/Output Summary (Last 24 hours) at 6/29/2022 1355  Last data filed at 6/29/2022 1300  Gross per 24 hour   Intake 1740 ml   Output 1475 ml   Net 265 ml       Core Measures & Quality Metrics  Labs reviewed, Medications reviewed and Radiology images reviewed  Fuchs catheter: One or Two Days Post Surgery (Day of Surgery being Day 0)      DVT Prophylaxis: Contraindicated - High bleeding risk  DVT prophylaxis - mechanical: SCDs      Assessed for rehab: Patient returned to prior level of function, rehabilitation not indicated at this time    RAP Score Total: 3    ETOH Screening  CAGE Score:  0  Assessment complete date: 6/29/2022 (CAGE postive)  Intervention: yes. Patient response to intervention: Denies alcohol abuse.  .   Patient demonstrates understanding of intervention. Patient does not agree to follow-up.   has not been contacted. Follow up with: PCP, [facility]  Total ETOH intervention time: 15 - 30 mintues      Assessment/Plan  * Subdural hematoma (HCC)- (present on admission)  Assessment & Plan  1.4 cm left holohemispheric subdural hematoma with associated mass effect and 7 mm left to right midline shift.  6/29 craniotomy  Post traumatic pharmacologic seizure prophylaxis for 1 week.  Speech Language Pathology cognitive evaluation.  Kirby Degroot MD. Neurosurgeon. [facility].    Contraindication to deep vein thrombosis (DVT) prophylaxis- (present on admission)  Assessment & Plan  Prophylactic anticoagulation for thrombotic prevention initially contraindicated secondary to elevated bleeding risk.  6/30 Trauma surveillance venous duplex scanning ordered.    Platelet dysfunction due to drugs- (present on admission)  Assessment & Plan  History of recent Ibuprofen use.  TEG with platelet mapping AA 24% and ADP 20.3% inhibition.    Trauma- (present on admission)  Assessment & Plan  Headaches.  Non Trauma Activation.  Carson Anna MD. Trauma Surgery.        Discussed patient condition with Patient and trauma surgery, Dr. Lucien Varghese.

## 2022-06-29 NOTE — ANESTHESIA PROCEDURE NOTES
Arterial Line  Performed by: Aldo Urbano M.D.  Authorized by: Aldo Urbano M.D.     Start Time:  6/29/2022 9:40 AM  End Time:  6/29/2022 9:42 AM  Localization: surface landmarks    Patient Location:  OR  Indication: continuous blood pressure monitoring        Catheter Size:  20 G  Seldinger Technique?: Yes    Laterality:  Right  Site:  Radial artery  Line Secured:  Antimicrobial disc, tape and transparent dressing  Events: patient tolerated procedure well with no complications

## 2022-06-29 NOTE — PROGRESS NOTES
Pt transported to Preop via bed (monitored, room air) by ACLS RN x2 and transporter.  Family at bedside.  Hand off completed to CHRIS Mares.  Pt placed on preop monitor.

## 2022-06-29 NOTE — PROGRESS NOTES
Media Information                                 Render Risk Assessment In Note?: no Detail Level: Simple Additional Notes: Patient was given printed prescription

## 2022-06-29 NOTE — OR NURSING
1153: Pt arrived from OR, handoff received from anesthesiologist and RN. Patient asleep, OPA in place. Unable to perform full assessment as patient is not awake from anesthesia. Pupils PERRLA, 2mm, brisk. Dressing to left side of head w/xeroform, telfa, and medipore tape, scant drainage-STEVEN drain to site w/80mL serosanguineous output. Patient heart rate ranging 40s-60s, sinus. Anesthesia aware, BP stable. Right radial artline in place, 3 pivs in place. Fuchs catheter placed in OR per report.     1225: Doctor Zane at bedside assessing patient. Patientsleeping, observed to be moving all extremities.     1230: Patient waking up, following commands when awake.Wife updated on status.     1250: Report given to sicu RN, Babs Dressing remains intact, no new drainage. Fuchs w/100ml clear/yellow output.

## 2022-06-29 NOTE — ASSESSMENT & PLAN NOTE
1.4 cm left holohemispheric subdural hematoma with associated mass effect and 7 mm left to right midline shift.  6/29 Craniotomy.  7/2 Subdural drain removed.  Post traumatic pharmacologic seizure prophylaxis for 1 week.  Speech Language Pathology cognitive evaluation completed. Recommends initial close monitoring of IADLs.   Kirby Degroot MD. Neurosurgeon. [facility].

## 2022-06-29 NOTE — ANESTHESIA POSTPROCEDURE EVALUATION
Patient: Martín Ro    Procedure Summary     Date: 06/29/22 Room / Location: [facility] 06 / SURGERY [facility]    Anesthesia Start: 0926 Anesthesia Stop: 1155    Procedure: CRANIOTOMY FOR SUBDURAL HEMATOMA (Left Head) Diagnosis: (SUBDURAL HEMATOMA)    Surgeons: Kirby Degroot M.D. Responsible Provider: Aldo Urbano M.D.    Anesthesia Type: general ASA Status: 2          Final Anesthesia Type: general  Last vitals  BP   Blood Pressure: (!) 141/82    Temp   36.4 °C (97.5 °F)    Pulse   84   Resp   16    SpO2   95 %      Anesthesia Post Evaluation    Patient location during evaluation: PACU  Patient participation: complete - patient participated  Level of consciousness: awake and alert  Pain score: 2    Airway patency: patent  Anesthetic complications: no  Cardiovascular status: hemodynamically stable  Respiratory status: acceptable  Hydration status: euvolemic    PONV: none          No complications documented.     Nurse Pain Score: 2 (NPRS)

## 2022-06-29 NOTE — THERAPY
Missed Therapy     Patient Name: Martín Ro  Age:  32 y.o., Sex:  male  Medical Record #: [id number]  Today's Date: 6/29/2022    Orders received for cognitive-linguistic evaluation. Pt currently in procedure. SLP to return as able and as pt medically appropriate.

## 2022-06-29 NOTE — ASSESSMENT & PLAN NOTE
Prophylactic anticoagulation for thrombotic prevention initially contraindicated secondary to elevated bleeding risk.  7/1 Trauma screening bilateral lower extremity venous duplex negative for above knee DVT.

## 2022-06-29 NOTE — ED PROVIDER NOTES
ED Provider Note    CHIEF COMPLAINT  Chief Complaint   Patient presents with   • Headache     X 1 week on and off       HPI  Martín Ro is a 32 y.o. male here for evaluation of headache.  Patient states he has had headaches over the last week or so, that it been intermittent.  He states they are in the front of his head, and described them as a squeezing sensation.  He has been taking Motrin for them, but states that he took some today and it did not help as much as he thought it would.  He has no vomiting, but does complain of some mild nausea.  He has no fever chills, no paresthesias or weakness, no chest pain or shortness of breath.  He denies any fall or trauma.  Patient takes no anticoagulants.      ROS  See HPI for further details, o/w negative.     PAST MEDICAL HISTORY   has a past medical history of Patient denies medical problems.    SOCIAL HISTORY  Social History     Tobacco Use   • Smoking status: Never Smoker   • Smokeless tobacco: Never Used   Vaping Use   • Vaping Use: Never used   Substance and Sexual Activity   • Alcohol use: Yes     Comment: occasionally   • Drug use: No   • Sexual activity: Not on file       Family History  No bleeding disorders    SURGICAL HISTORY   has a past surgical history that includes circumcision adult (4/25/2011).    CURRENT MEDICATIONS  Home Medications     Reviewed by Fadia Garcia R.N. (Registered Nurse) on 06/28/22 at 2055  Med List Status: Not Addressed   Medication Last Dose Status        Patient Solomon Taking any Medications                       ALLERGIES  No Known Allergies    REVIEW OF SYSTEMS  See HPI for further details. Review of systems as above, otherwise all other systems are negative.     PHYSICAL EXAM  Constitutional: Well developed, well nourished.  Mild acute distress.  HEENT: Normocephalic, atraumatic. Posterior pharynx clear and moist.  Eyes:  EOMI. Normal sclera.  Neck: Supple, Full range of motion, nontender.  Chest/Pulmonary: clear to  ausculation. Symmetrical expansion.   Cardio: Regular rate and rhythm with no murmur.   Abdomen: Soft, nontender. No peritoneal signs. No guarding. No palpable masses.  Musculoskeletal: No deformity, no edema, neurovascular intact.   Neuro: Clear speech, appropriate, cooperative, cranial nerves II-XII grossly intact.  Good finger-nose, negative Romberg, good heel-to-shin, steady unassisted gait.  Psych: Normal mood and affect    PROCEDURES     MEDICAL RECORD  I have reviewed patient's medical record and pertinent results are listed.    COURSE & MEDICAL DECISION MAKING  I have reviewed any medical record information, laboratory studies and radiographic results as noted above.      CRITICAL CARE  The very real possibility of a deterioration of this patient's condition required the highest level of my preparedness for sudden, emergent intervention.  I provided critical care services, which included medication orders, frequent reevaluations of the patient's condition and response to treatment, ordering and reviewing test results, and discussing the case with various consultants.  The critical care time associated with the care of the patient was 40 minutes. Review chart for interventions. This time is exclusive of any other billable procedures.       CT-HEAD W/O   Final Result         1.  1.4 cm left holohemispheric subdural hematoma with associated mass effect and 7 mm left to right midline shift   2.  Dilatation the right ventricular system, most pronounced involving the temporal horn, appearance suggests component of hydrocephalus, likely obstructive hydrocephalus from midline shift.      These findings were discussed with the patient's clinician, Heriberto Marin, on 6/28/2022 10:52 PM.        10:55 PM  I spoke with radiology, who states patient has a subdural noted.     11:15 PM  Spoke with neurosurgery, who will likely take the patient to the OR.  Teg is ordered, and patient has no focal neurodeficits. Dr. Degroot  would like him in the trauma icu.     11:32 PM  I spoke to Dr. Anna, who will kindly admit the pt to the ICU.  Pt comfortable with the plan.     FINAL IMPRESSION  1. Subdural hemorrhage (HCC)     2.      Critical care time 40 minutes.         Electronically signed by: Heriberto Marin D.O., 6/28/2022 11:30 PM

## 2022-06-29 NOTE — PROGRESS NOTES
Trauma / Surgical Daily Progress Note    Date of Service  6/29/2022    Chief Complaint  32 y.o. male admitted 6/28/2022 with large subdural hematoma requiring emergent craniotomy    Interval Events  Hospital day #2  Pt currently requires ICU care and hospital admission  Seen on rounds and discussed with multidisciplinary team  Injuries and physiologic derangements result in significant risk of long term morbidity  Events and interventions include:  Integration of multiple data points and associated complex medical decisions   Supportive care for TBI  Post op resuscitation  Pain control    Review of Systems  Review of Systems   Unable to perform ROS: Patient nonverbal        Vital Signs for last 24 hours  Temp:  [36.1 °C (97 °F)-36.6 °C (97.8 °F)] 36.2 °C (97.2 °F)  Pulse:  [] 60  Resp:  [] 19  BP: (109-150)/() 116/75  SpO2:  [93 %-100 %] 98 %    Hemodynamic parameters for last 24 hours       Respiratory Data     Respiration: 19, Pulse Oximetry: 98 %             Physical Exam  Physical Exam  Constitutional:       General: He is not in acute distress.     Appearance: He is obese. He is not toxic-appearing.   HENT:      Head:      Comments: Dressings in place over left cranium     Right Ear: External ear normal.      Left Ear: External ear normal.      Nose: Nose normal.   Eyes:      General: No scleral icterus.     Pupils: Pupils are equal, round, and reactive to light.   Cardiovascular:      Rate and Rhythm: Normal rate and regular rhythm.      Pulses: Normal pulses.      Heart sounds: Normal heart sounds.   Pulmonary:      Effort: Pulmonary effort is normal. No respiratory distress.      Breath sounds: Normal breath sounds.   Abdominal:      General: There is no distension.      Palpations: Abdomen is soft.   Musculoskeletal:         General: Normal range of motion.      Cervical back: Neck supple. No rigidity.   Skin:     General: Skin is warm and dry.      Capillary Refill: Capillary refill  takes less than 2 seconds.      Coloration: Skin is not jaundiced.      Findings: No bruising.   Neurological:      General: No focal deficit present.      Mental Status: He is oriented to person, place, and time.      Comments: Reported prior to surgery   Psychiatric:      Comments: Unable to assess         Laboratory  Recent Results (from the past 24 hour(s))   CBC WITH DIFFERENTIAL    Collection Time: 06/28/22 11:19 PM   Result Value Ref Range    WBC 8.6 4.8 - 10.8 K/uL    RBC 4.99 4.70 - 6.10 M/uL    Hemoglobin 14.9 14.0 - 18.0 g/dL    Hematocrit 44.0 42.0 - 52.0 %    MCV 88.2 81.4 - 97.8 fL    MCH 29.9 27.0 - 33.0 pg    MCHC 33.9 33.7 - 35.3 g/dL    RDW 42.0 35.9 - 50.0 fL    Platelet Count 222 164 - 446 K/uL    MPV 10.0 9.0 - 12.9 fL    Neutrophils-Polys 48.60 44.00 - 72.00 %    Lymphocytes 39.90 22.00 - 41.00 %    Monocytes 8.50 0.00 - 13.40 %    Eosinophils 2.20 0.00 - 6.90 %    Basophils 0.50 0.00 - 1.80 %    Immature Granulocytes 0.30 0.00 - 0.90 %    Nucleated RBC 0.00 /100 WBC    Neutrophils (Absolute) 4.18 1.82 - 7.42 K/uL    Lymphs (Absolute) 3.43 1.00 - 4.80 K/uL    Monos (Absolute) 0.73 0.00 - 0.85 K/uL    Eos (Absolute) 0.19 0.00 - 0.51 K/uL    Baso (Absolute) 0.04 0.00 - 0.12 K/uL    Immature Granulocytes (abs) 0.03 0.00 - 0.11 K/uL    NRBC (Absolute) 0.00 K/uL   COMP METABOLIC PANEL    Collection Time: 06/28/22 11:19 PM   Result Value Ref Range    Sodium 138 135 - 145 mmol/L    Potassium 3.6 3.6 - 5.5 mmol/L    Chloride 105 96 - 112 mmol/L    Co2 24 20 - 33 mmol/L    Anion Gap 9.0 7.0 - 16.0    Glucose 113 (H) 65 - 99 mg/dL    Bun 15 8 - 22 mg/dL    Creatinine 0.94 0.50 - 1.40 mg/dL    Calcium 9.2 8.5 - 10.5 mg/dL    AST(SGOT) 32 12 - 45 U/L    ALT(SGPT) 50 2 - 50 U/L    Alkaline Phosphatase 65 30 - 99 U/L    Total Bilirubin 1.3 0.1 - 1.5 mg/dL    Albumin 4.7 3.2 - 4.9 g/dL    Total Protein 7.4 6.0 - 8.2 g/dL    Globulin 2.7 1.9 - 3.5 g/dL    A-G Ratio 1.7 g/dL   PLATELET MAPPING WITH BASIC TEG     Collection Time: 06/28/22 11:19 PM   Result Value Ref Range    Reaction Time Initial-R 5.3 4.6 - 9.1 min    React Time Initial Hep 5.9 4.3 - 8.3 min    Clot Kinetics-K 1.5 0.8 - 2.1 min    Clot Angle-Angle 71.0 63.0 - 78.0 degrees    Maximum Clot Strength-MA 53.8 52.0 - 69.0 mm    TEG Functional Fibrinogen(MA) 16.6 15.0 - 32.0 mm    Lysis 30 minutes-LY30 0.7 0.0 - 2.6 %    % Inhibition ADP 20.3 (H) 0.0 - 17.0 %    % Inhibition AA 24.0 (H) 0.0 - 11.0 %    TEG Algorithm Link Algorithm    ESTIMATED GFR    Collection Time: 06/28/22 11:19 PM   Result Value Ref Range    GFR (CKD-EPI) 111 >60 mL/min/1.73 m 2   ABO Rh Confirm    Collection Time: 06/28/22 11:19 PM   Result Value Ref Range    ABO Rh Confirm A POS    PROTHROMBIN TIME    Collection Time: 06/28/22 11:35 PM   Result Value Ref Range    PT 13.1 12.0 - 14.6 sec    INR 1.02 0.87 - 1.13   COD - Adult (Type and Screen)    Collection Time: 06/29/22 10:02 AM   Result Value Ref Range    ABO Grouping Only A     Rh Grouping Only POS     Antibody Screen-Cod NEG        Fluids    Intake/Output Summary (Last 24 hours) at 6/29/2022 1355  Last data filed at 6/29/2022 1300  Gross per 24 hour   Intake 1740 ml   Output 1475 ml   Net 265 ml       Core Measures & Quality Metrics  Labs reviewed, Radiology images reviewed and Medications reviewed  Fuchs catheter: No Fuchs      DVT Prophylaxis: Contraindicated - High bleeding risk  DVT prophylaxis - mechanical: SCDs  Ulcer prophylaxis: Not indicated        CHICHI Score  ETOH Screening    Assessment/Plan  * Subdural hematoma (HCC)- (present on admission)  Assessment & Plan  1.4 cm left holohemispheric subdural hematoma with associated mass effect and 7 mm left to right midline shift.  6/29 craniotomy  Post traumatic pharmacologic seizure prophylaxis for 1 week.  Speech Language Pathology cognitive evaluation.  Kirby Degroot MD. Neurosurgeon. [facility].    Contraindication to deep vein thrombosis (DVT) prophylaxis-  (present on admission)  Assessment & Plan  Prophylactic anticoagulation for thrombotic prevention initially contraindicated secondary to elevated bleeding risk.  6/30 Trauma surveillance venous duplex scanning ordered.    Platelet dysfunction due to drugs- (present on admission)  Assessment & Plan  History of recent Ibuprofen use.  TEG with platelet mapping AA 24% and ADP 20.3% inhibition.    Trauma- (present on admission)  Assessment & Plan  Headaches.  Non Trauma Activation.  Carson Anna MD. Trauma Surgery.        Discussed patient condition with RN, RT, Pharmacy, Dietary and .

## 2022-06-29 NOTE — ANESTHESIA PROCEDURE NOTES
Airway    Date/Time: 6/29/2022 9:37 AM  Performed by: Aldo Urbano M.D.  Authorized by: Aldo Urbano M.D.     Location:  OR  Urgency:  Elective  Difficult Airway: No    Indications for Airway Management:  Anesthesia      Spontaneous Ventilation: absent    Sedation Level:  Deep  Preoxygenated: Yes    Patient Position:  Sniffing  Mask Difficulty Assessment:  2 - vent by mask + OA or adjuvant +/- NMBA  Final Airway Type:  Endotracheal airway  Final Endotracheal Airway:  ETT  Cuffed: Yes    Technique Used for Successful ETT Placement:  Direct laryngoscopy    Insertion Site:  Oral  Blade Type:  Hutchison  Laryngoscope Blade/Videolaryngoscope Blade Size:  2  ETT Size (mm):  8.0  Measured from:  Teeth  ETT to Teeth (cm):  24  Placement Verified by: auscultation and capnometry    Cormack-Lehane Classification:  Grade IIa - partial view of glottis  Number of Attempts at Approach:  1

## 2022-06-29 NOTE — CARE PLAN
The patient is watcher    Shift Goals  Clinical Goals: Neuro checks  Patient Goals: rest be updated on POC    Progress made toward(s) clinical / shift goals:  progressing

## 2022-06-29 NOTE — PROGRESS NOTES
MD Degroot at bedside to discuss treatment plan with patient. Per MD Degroot, pt does not need 0500 CT scan. Keep NPO for surgery in am. 0900 start time planned.

## 2022-06-29 NOTE — ED NOTES
Received report; assumed care of Pt.    Introduced self to Pt; informed him that I am part of his care team.  Placed Pt on cardiac monitor.  Rounded on Pt. Updated Pt on plan of care. Pt verbalized understanding.  No acute distress at this time.  Will continue to monitor.

## 2022-06-29 NOTE — H&P
"    CHIEF COMPLAINT: Headache.     HISTORY OF PRESENT ILLNESS: The patient is a 32 year-old  man who presented to the [facility] ER with one week of frontal headaches. He describes them as squeezing in character, he has been taking Motrin for this pain without improvement. Her denies nausea and vomiting but notes dizziness. He does not recall striking his head prior to the onset of his symptoms. He denies a history of bleeding after surgery or minor traumas. Given his symptoms a CT scan was obtained which showed a 1.4 cm subdural hematoma with 7 mm of midline shift, Neurosurgery was consulted who recommended admission to the Trauma ICU for further observation and possibly surgery.    TRIAGE CATEGORY: The patient was triaged as a Non-Trauma activation. An expeditious primary and secondary survey with required adjuncts was conducted. See Trauma Narrator for full details.    PAST MEDICAL HISTORY:  has a past medical history of Patient denies medical problems.    PAST SURGICAL HISTORY:  has a past surgical history that includes circumcision adult (4/25/2011).    ALLERGIES: No Known Allergies    CURRENT MEDICATIONS:   Home Medications     Reviewed by Fadia Garcia R.N. (Registered Nurse) on 06/28/22 at 2055  Med List Status: Not Addressed   Medication Last Dose Status        Patient Solomon Taking any Medications                       FAMILY HISTORY: family history is not on file.    SOCIAL HISTORY:  reports that he has never smoked. He has never used smokeless tobacco. He reports current alcohol use. He reports that he does not use drugs.    REVIEW OF SYSTEMS: Comprehensive review of systems is negative with the exception of the aforementioned HPI, PMH, and PSH bullets in accordance with CMS guidelines.    PHYSICAL EXAMINATION:      Vital Signs: BP (!) 148/102   Pulse 80   Temp 36.4 °C (97.6 °F) (Temporal)   Resp 16   Ht 1.778 m (5' 10\")   Wt 111 kg (244 lb 14.9 oz)   SpO2 98%   Physical Exam  Constitutional: "       Appearance: Normal appearance. He is well-developed.   HENT:      Head: Normocephalic and atraumatic.      Right Ear: External ear normal.      Left Ear: External ear normal.      Nose: Nose normal.      Mouth/Throat:      Mouth: Mucous membranes are moist.      Pharynx: Oropharynx is clear.   Eyes:      General: No scleral icterus.     Extraocular Movements: Extraocular movements intact.      Pupils: Pupils are equal, round, and reactive to light.   Neck:      Trachea: Trachea and phonation normal.   Cardiovascular:      Rate and Rhythm: Normal rate and regular rhythm.      Pulses: Normal pulses.   Pulmonary:      Effort: Pulmonary effort is normal. No respiratory distress.   Chest:      Chest wall: No deformity, tenderness or crepitus.   Abdominal:      General: There is no distension.      Palpations: Abdomen is soft.      Tenderness: There is no abdominal tenderness. There is no guarding or rebound.   Musculoskeletal:         General: No swelling, tenderness or deformity. Normal range of motion.      Cervical back: Full passive range of motion without pain, normal range of motion and neck supple. No deformity, tenderness or bony tenderness. No pain with movement, spinous process tenderness or muscular tenderness.      Thoracic back: No deformity, tenderness or bony tenderness.      Lumbar back: No deformity, tenderness or bony tenderness. Normal range of motion.   Skin:     General: Skin is warm and dry.      Capillary Refill: Capillary refill takes less than 2 seconds.      Coloration: Skin is not jaundiced.   Neurological:      General: No focal deficit present.      Mental Status: He is alert and oriented to person, place, and time.      GCS: GCS eye subscore is 4. GCS verbal subscore is 5. GCS motor subscore is 6.   Psychiatric:         Mood and Affect: Mood normal.         Behavior: Behavior normal. Behavior is cooperative.         Thought Content: Thought content normal.         Judgment: Judgment  normal.         LABORATORY VALUES:   Recent Labs     06/28/22  2319   WBC 8.6   RBC 4.99   HEMOGLOBIN 14.9   HEMATOCRIT 44.0   MCV 88.2   MCH 29.9   MCHC 33.9   RDW 42.0   PLATELETCT 222   MPV 10.0     Recent Labs     06/28/22  2319   SODIUM 138   POTASSIUM 3.6   CHLORIDE 105   CO2 24   GLUCOSE 113*   BUN 15   CREATININE 0.94   CALCIUM 9.2     Recent Labs     06/28/22  2319   ASTSGOT 32   ALTSGPT 50   TBILIRUBIN 1.3   ALKPHOSPHAT 65   GLOBULIN 2.7            IMAGING:   CT-HEAD W/O   Final Result         1.  1.4 cm left holohemispheric subdural hematoma with associated mass effect and 7 mm left to right midline shift   2.  Dilatation the right ventricular system, most pronounced involving the temporal horn, appearance suggests component of hydrocephalus, likely obstructive hydrocephalus from midline shift.      These findings were discussed with the patient's clinician, Heriberto Marin, on 6/28/2022 10:52 PM.      CT-HEAD W/O    (Results Pending)       ASSESSMENT AND PLAN:     Trauma  Headaches.  Non Trauma Activation.  Carson Anna MD. Trauma Surgery.    Subdural hematoma (HCC)  1.4 cm left holohemispheric subdural hematoma with associated mass effect and 7 mm left to right midline shift.  Definitive plan pending.  Post traumatic pharmacologic seizure prophylaxis for 1 week.  Speech Language Pathology cognitive evaluation.  Kirby Degroot MD. Neurosurgeon. [facility].    Platelet dysfunction due to drugs  History of recent Ibuprofen use.  TEG with platelet mapping pending results.  May require transfusion of platelets to correct inhibition.    Contraindication to deep vein thrombosis (DVT) prophylaxis  Prophylactic anticoagulation for thrombotic prevention initially contraindicated secondary to elevated bleeding risk.  6/30 Trauma surveillance venous duplex scanning ordered.      DISPOSITION: Trauma ICU.  Trauma tertiary survey.    CRITICAL CARE TIME: 35 minutes excluding procedures.        ____________________________________     Carson Anna M.D.    DD: 6/28/2022  11:28 PM

## 2022-06-29 NOTE — OR SURGEON
Immediate Post OP Note    PreOp Diagnosis: left SDH      PostOp Diagnosis: same      Procedure(s):  CRANIOTOMY FOR SUBDURAL HEMATOMA - Wound Class: Clean    Surgeon(s):  Kirby Degroot M.D.    Anesthesiologist/Type of Anesthesia:  Anesthesiologist: Aldo Urbano M.D./General    Surgical Staff:  Assistant: Ann Mackay; SUDARSHAN Nguyen  Circulator: Mita Núñez R.N.  Scrub Person: Crystal Mims    Specimens removed if any:  * No specimens in log *    Estimated Blood Loss: 75cc    Findings: evac SDH    Complications: none        6/29/2022 11:38 AM YESENIA NguyenPKimberleyNKimberley

## 2022-06-29 NOTE — ED NOTES
RAZIA placed blood sent to lab by Hannah PEREZ. clinical findings noted on CT pt roomed to [address] per ERP charge notified report given to Jose PEREZ

## 2022-06-29 NOTE — CONSULTS
DATE OF SERVICE:  06/29/2022     NEUROSURGICAL CONSULTATION     HISTORY OF PRESENT ILLNESS:  The patient is a pleasant 32-year-old male who   was doing cartwheels with some family about 2 weeks ago and soon thereafter   had this squishy sensation in his head like a swimming sensation that over the   past 2 weeks has progressed to intermittent headaches and then more constant   headaches that are worse in intensity.  He has not had any weakness, numbness   or tingling.  No nausea or vomiting.     PAST MEDICAL HISTORY:  None.     PAST SURGICAL HISTORY:  None.     ALLERGIES:  None.     MEDICATIONS:  Occasional Motrin.     PHYSICAL EXAMINATION:   GENERAL:  A and O x3, GCS of 15.  HEENT:  Pupils equal, round, reactive to light.  Extraocular muscles intact.    Tongue midline.  Face symmetric.  NEUROLOGIC:  Motor is 5/5 strength in all muscle groups in the upper and lower   extremities.  No drift.  Sensory grossly intact to light touch.     LABORATORY VALUES:  CBC within normal limits.  Basic metabolic panel within   normal limits.  INR is 1.02. TEG is normal.  AA and ADP inhibition are 24% and   20%, respectively.  Remainder is normal.     IMAGING:  CT scan of the brain, noncontrast at 10:40 p.m., shows a left-sided   holohemispheric subacute subdural hematoma measuring 14 mm with 7 mm of left   to right midline shift.     PLAN:  Keep the patient n.p.o., Keppra 500 b.i.d. x7 days.  I have discussed a   left-sided craniotomy for evacuation of subdural hematoma that we will do in   the morning.  The patient does not need to have any platelets given his TEG.    We will follow the patient closely.  Minimize sedation.  No NSAIDs. Will   follow closely.        ______________________________  Kirby Degroot MD    CPD/TIGRE/AKNIT    DD:  06/29/2022 01:52  DT:  06/29/2022 06:18    Job#:  [id number]

## 2022-06-29 NOTE — PROGRESS NOTES
Patient arrives to S113 with ACLS RN and CCT    80 HR  143/91 BP  97% O2 on RA  18 RR  111.8 kg  97.6F    4 Eyes Skin Assessment Completed by Deborah RN and Jeff RN.    Head WDL  Ears WDL  Nose WDL  Mouth WDL  Neck WDL  Breast/Chest WDL  Shoulder Blades WDL  Spine WDL  (R) Arm/Elbow/Hand WDL  (L) Arm/Elbow/Hand WDL  Abdomen WDL  Groin WDL  Scrotum/Coccyx/Buttocks WDL  (R) Leg WDL  (L) Leg WDL  (R) Heel/Foot/Toe WDL  (L) Heel/Foot/Toe WDL        Devices In Places ECG, Blood Pressure Cuff, Pulse Ox and SCD's      Interventions In Place Sacral Mepilex, Pillows, Q2 Turns and Low Air Loss Mattress    Possible Skin Injury No    Pictures Uploaded Into Epic N/A  Wound Consult Placed N/A  RN Wound Prevention Protocol Ordered N/A    Patient belongings: Shoes, socks, Wallet, underwear, shorts, shirt, cellphone, ipad,

## 2022-06-29 NOTE — PROGRESS NOTES
Neurosurgery Progress Note    Subjective:  Denies HA  No complaints, doing well  Exam:    A&O x3, GCS 15  PERRL, EOMI  Face symm, tongue midline  SCOTT with FS, no drift      BP  Min: 115/55  Max: 150/89  Pulse  Av.6  Min: 72  Max: 105  Resp  Av.2  Min: 16  Max: 129  Temp  Av.4 °C (97.6 °F)  Min: 36.4 °C (97.5 °F)  Max: 36.4 °C (97.6 °F)  SpO2  Av.7 %  Min: 93 %  Max: 99 %    No data recorded    Recent Labs     22  2319   WBC 8.6   RBC 4.99   HEMOGLOBIN 14.9   HEMATOCRIT 44.0   MCV 88.2   MCH 29.9   MCHC 33.9   RDW 42.0   PLATELETCT 222   MPV 10.0     Recent Labs     22  2319   SODIUM 138   POTASSIUM 3.6   CHLORIDE 105   CO2 24   GLUCOSE 113*   BUN 15   CREATININE 0.94   CALCIUM 9.2     Recent Labs     22  2335   INR 1.02     Recent Labs     22  2319   REACTMIN 5.3   CLOTKINET 1.5   CLOTANGL 71.0   MAXCLOTS 53.8   ARX75UXT 0.7   PRCINADP 20.3*   PRCINAA 24.0*       Intake/Output                       22 - 2259 22 - 2259     1363-3231 9607-1731 Total 1900-0659 Total                 Intake    I.V.  --  540 540  --  -- --    Volume (mL) (NS infusion) -- 540 540 -- -- --    Total Intake -- 540 540 -- -- --       Output    Urine  --  550 550  475  -- 475    Number of Times Voided -- 1 x 1 x 1 x -- 1 x    Urine Void (mL) -- 550 550 475 -- 475    Stool  --  -- --  --  -- --    Number of Times Stooled -- 0 x 0 x -- -- --    Total Output -- 550 550 475 -- 475       Net I/O     -- -10 -10 475 -- -475            Intake/Output Summary (Last 24 hours) at 2022 0807  Last data filed at 2022 0700  Gross per 24 hour   Intake 540 ml   Output 1025 ml   Net -485 ml            • Respiratory Therapy Consult   Continuous RT   • Pharmacy Consult Request  1 Each PHARMACY TO DOSE   • ondansetron  4 mg Q4HRS PRN   • ondansetron  4 mg Q4HRS PRN   • docusate sodium  100 mg BID   • polyethylene glycol/lytes  1 Packet BID   • bisacodyl  10 mg  Q24HRS PRN   • sodium phosphate  1 Each Once PRN   • NS   Continuous   • acetaminophen  1,000 mg Q6HRS    Followed by   • [START ON 7/4/2022] acetaminophen  1,000 mg Q6HRS PRN   • oxyCODONE immediate-release  2.5 mg Q3HRS PRN    Or   • oxyCODONE immediate-release  5 mg Q3HRS PRN    Or   • HYDROmorphone  0.25 mg Q3HRS PRN   • famotidine  20 mg BID    Or   • famotidine  20 mg BID   • levETIRAcetam  500 mg Q12HRS    Or   • levETIRAcetam (Keppra) IV  500 mg Q12HRS       Assessment and Plan:  Hospital day # 2   1.4 cm left holohemispheric subdural hematoma with associated mass effect and 7 mm left to right midline shift  POD#  Chemical prophylactic DVT therapy: No  Start date/time: TBD    Neuro intact  Q 1 hr neuro checks  keppra 500mg BID x 7 days  NPO for surgery  OR this am for left crani  TEG normal

## 2022-06-30 ENCOUNTER — HOSPITAL ENCOUNTER (OUTPATIENT)
Dept: RADIOLOGY | Facility: MEDICAL CENTER | Age: 32
End: 2022-06-30
Attending: CLINICAL NURSE SPECIALIST
Payer: COMMERCIAL

## 2022-06-30 LAB
ALBUMIN SERPL BCP-MCNC: 3.7 G/DL (ref 3.2–4.9)
ALBUMIN/GLOB SERPL: 1.5 G/DL
ALP SERPL-CCNC: 53 U/L (ref 30–99)
ALT SERPL-CCNC: 31 U/L (ref 2–50)
ANION GAP SERPL CALC-SCNC: 10 MMOL/L (ref 7–16)
AST SERPL-CCNC: 16 U/L (ref 12–45)
BASOPHILS # BLD AUTO: 0.2 % (ref 0–1.8)
BASOPHILS # BLD: 0.02 K/UL (ref 0–0.12)
BILIRUB SERPL-MCNC: 1.4 MG/DL (ref 0.1–1.5)
BUN SERPL-MCNC: 10 MG/DL (ref 8–22)
CALCIUM SERPL-MCNC: 8.5 MG/DL (ref 8.5–10.5)
CHLORIDE SERPL-SCNC: 108 MMOL/L (ref 96–112)
CO2 SERPL-SCNC: 21 MMOL/L (ref 20–33)
CREAT SERPL-MCNC: 0.84 MG/DL (ref 0.5–1.4)
EOSINOPHIL # BLD AUTO: 0.05 K/UL (ref 0–0.51)
EOSINOPHIL NFR BLD: 0.4 % (ref 0–6.9)
ERYTHROCYTE [DISTWIDTH] IN BLOOD BY AUTOMATED COUNT: 41.9 FL (ref 35.9–50)
GFR SERPLBLD CREATININE-BSD FMLA CKD-EPI: 119 ML/MIN/1.73 M 2
GLOBULIN SER CALC-MCNC: 2.5 G/DL (ref 1.9–3.5)
GLUCOSE SERPL-MCNC: 114 MG/DL (ref 65–99)
HCT VFR BLD AUTO: 38 % (ref 42–52)
HGB BLD-MCNC: 13.1 G/DL (ref 14–18)
IMM GRANULOCYTES # BLD AUTO: 0.04 K/UL (ref 0–0.11)
IMM GRANULOCYTES NFR BLD AUTO: 0.3 % (ref 0–0.9)
LYMPHOCYTES # BLD AUTO: 2.95 K/UL (ref 1–4.8)
LYMPHOCYTES NFR BLD: 24.6 % (ref 22–41)
MAGNESIUM SERPL-MCNC: 1.8 MG/DL (ref 1.5–2.5)
MCH RBC QN AUTO: 30.5 PG (ref 27–33)
MCHC RBC AUTO-ENTMCNC: 34.5 G/DL (ref 33.7–35.3)
MCV RBC AUTO: 88.4 FL (ref 81.4–97.8)
MONOCYTES # BLD AUTO: 1.01 K/UL (ref 0–0.85)
MONOCYTES NFR BLD AUTO: 8.4 % (ref 0–13.4)
NEUTROPHILS # BLD AUTO: 7.91 K/UL (ref 1.82–7.42)
NEUTROPHILS NFR BLD: 66.1 % (ref 44–72)
NRBC # BLD AUTO: 0 K/UL
NRBC BLD-RTO: 0 /100 WBC
PHOSPHATE SERPL-MCNC: 3.1 MG/DL (ref 2.5–4.5)
PLATELET # BLD AUTO: 193 K/UL (ref 164–446)
PMV BLD AUTO: 10 FL (ref 9–12.9)
POTASSIUM SERPL-SCNC: 4.1 MMOL/L (ref 3.6–5.5)
PROT SERPL-MCNC: 6.2 G/DL (ref 6–8.2)
RBC # BLD AUTO: 4.3 M/UL (ref 4.7–6.1)
SODIUM SERPL-SCNC: 139 MMOL/L (ref 135–145)
WBC # BLD AUTO: 12 K/UL (ref 4.8–10.8)

## 2022-06-30 PROCEDURE — 83735 ASSAY OF MAGNESIUM: CPT

## 2022-06-30 PROCEDURE — 97163 PT EVAL HIGH COMPLEX 45 MIN: CPT

## 2022-06-30 PROCEDURE — 84100 ASSAY OF PHOSPHORUS: CPT

## 2022-06-30 PROCEDURE — 700102 HCHG RX REV CODE 250 W/ 637 OVERRIDE(OP): Performed by: NEUROLOGICAL SURGERY

## 2022-06-30 PROCEDURE — 700102 HCHG RX REV CODE 250 W/ 637 OVERRIDE(OP): Performed by: CLINICAL NURSE SPECIALIST

## 2022-06-30 PROCEDURE — 700105 HCHG RX REV CODE 258: Performed by: SURGERY

## 2022-06-30 PROCEDURE — 92523 SPEECH SOUND LANG COMPREHEN: CPT

## 2022-06-30 PROCEDURE — 85025 COMPLETE CBC W/AUTO DIFF WBC: CPT

## 2022-06-30 PROCEDURE — 700111 HCHG RX REV CODE 636 W/ 250 OVERRIDE (IP): Performed by: CLINICAL NURSE SPECIALIST

## 2022-06-30 PROCEDURE — 700102 HCHG RX REV CODE 250 W/ 637 OVERRIDE(OP): Performed by: SURGERY

## 2022-06-30 PROCEDURE — A9270 NON-COVERED ITEM OR SERVICE: HCPCS | Performed by: NEUROLOGICAL SURGERY

## 2022-06-30 PROCEDURE — 99233 SBSQ HOSP IP/OBS HIGH 50: CPT | Performed by: SURGERY

## 2022-06-30 PROCEDURE — 770022 HCHG ROOM/CARE - ICU (200)

## 2022-06-30 PROCEDURE — 97165 OT EVAL LOW COMPLEX 30 MIN: CPT

## 2022-06-30 PROCEDURE — A9270 NON-COVERED ITEM OR SERVICE: HCPCS | Performed by: CLINICAL NURSE SPECIALIST

## 2022-06-30 PROCEDURE — 80053 COMPREHEN METABOLIC PANEL: CPT

## 2022-06-30 PROCEDURE — 70450 CT HEAD/BRAIN W/O DYE: CPT

## 2022-06-30 PROCEDURE — A9270 NON-COVERED ITEM OR SERVICE: HCPCS | Performed by: SURGERY

## 2022-06-30 RX ADMIN — LEVETIRACETAM 500 MG: 500 TABLET, FILM COATED ORAL at 17:21

## 2022-06-30 RX ADMIN — FAMOTIDINE 20 MG: 20 TABLET ORAL at 17:21

## 2022-06-30 RX ADMIN — ACETAMINOPHEN 1000 MG: 500 TABLET, FILM COATED ORAL at 11:08

## 2022-06-30 RX ADMIN — ACETAMINOPHEN 1000 MG: 500 TABLET, FILM COATED ORAL at 06:19

## 2022-06-30 RX ADMIN — OXYCODONE 5 MG: 5 TABLET ORAL at 15:27

## 2022-06-30 RX ADMIN — OXYCODONE 2.5 MG: 5 TABLET ORAL at 10:36

## 2022-06-30 RX ADMIN — ACETAMINOPHEN 1000 MG: 500 TABLET, FILM COATED ORAL at 17:21

## 2022-06-30 RX ADMIN — POLYETHYLENE GLYCOL 3350 1 PACKET: 17 POWDER, FOR SOLUTION ORAL at 06:19

## 2022-06-30 RX ADMIN — OXYCODONE 5 MG: 5 TABLET ORAL at 01:23

## 2022-06-30 RX ADMIN — ACETAMINOPHEN 1000 MG: 500 TABLET, FILM COATED ORAL at 00:09

## 2022-06-30 RX ADMIN — FAMOTIDINE 20 MG: 20 TABLET ORAL at 06:19

## 2022-06-30 RX ADMIN — SODIUM CHLORIDE: 9 INJECTION, SOLUTION INTRAVENOUS at 01:28

## 2022-06-30 RX ADMIN — OXYCODONE 2.5 MG: 5 TABLET ORAL at 06:25

## 2022-06-30 RX ADMIN — LEVETIRACETAM 500 MG: 500 TABLET, FILM COATED ORAL at 06:19

## 2022-06-30 RX ADMIN — DOCUSATE SODIUM 100 MG: 100 CAPSULE, LIQUID FILLED ORAL at 17:21

## 2022-06-30 RX ADMIN — CEFAZOLIN SODIUM 2 G: 2 INJECTION, SOLUTION INTRAVENOUS at 01:25

## 2022-06-30 RX ADMIN — POLYETHYLENE GLYCOL 3350 1 PACKET: 17 POWDER, FOR SOLUTION ORAL at 17:22

## 2022-06-30 RX ADMIN — DOCUSATE SODIUM 100 MG: 100 CAPSULE, LIQUID FILLED ORAL at 06:00

## 2022-06-30 ASSESSMENT — COGNITIVE AND FUNCTIONAL STATUS - GENERAL
MOBILITY SCORE: 24
SUGGESTED CMS G CODE MODIFIER MOBILITY: CH
SUGGESTED CMS G CODE MODIFIER DAILY ACTIVITY: CH
DAILY ACTIVITIY SCORE: 24

## 2022-06-30 ASSESSMENT — PAIN DESCRIPTION - PAIN TYPE
TYPE: ACUTE PAIN;SURGICAL PAIN
TYPE: ACUTE PAIN
TYPE: ACUTE PAIN;SURGICAL PAIN
TYPE: ACUTE PAIN
TYPE: ACUTE PAIN;SURGICAL PAIN
TYPE: ACUTE PAIN
TYPE: ACUTE PAIN;SURGICAL PAIN
TYPE: ACUTE PAIN;SURGICAL PAIN
TYPE: ACUTE PAIN
TYPE: ACUTE PAIN;SURGICAL PAIN
TYPE: ACUTE PAIN
TYPE: ACUTE PAIN

## 2022-06-30 ASSESSMENT — GAIT ASSESSMENTS
GAIT LEVEL OF ASSIST: SUPERVISED
DEVIATION: BRADYKINETIC
DISTANCE (FEET): 250

## 2022-06-30 ASSESSMENT — PATIENT HEALTH QUESTIONNAIRE - PHQ9
2. FEELING DOWN, DEPRESSED, IRRITABLE, OR HOPELESS: NOT AT ALL
1. LITTLE INTEREST OR PLEASURE IN DOING THINGS: NOT AT ALL
SUM OF ALL RESPONSES TO PHQ9 QUESTIONS 1 AND 2: 0

## 2022-06-30 ASSESSMENT — FIBROSIS 4 INDEX: FIB4 SCORE: 0.63

## 2022-06-30 ASSESSMENT — ACTIVITIES OF DAILY LIVING (ADL): TOILETING: INDEPENDENT

## 2022-06-30 NOTE — PROGRESS NOTES
Trauma / Surgical Daily Progress Note    Date of Service  6/30/2022    Chief Complaint  32 y.o. male admitted 6/28/2022 with large subdural hematoma requiring emergent craniotomy    Interval Events  Hospital day #2  Pt currently requires ICU care and hospital admission  Seen on rounds and discussed with multidisciplinary team  Injuries and physiologic derangements result in significant risk of long term morbidity  Events and interventions include:  Integration of multiple data points and associated complex medical decisions   Supportive care for TBI  Pain control  Pulmonary toilet    Review of Systems  Review of Systems   Unable to perform ROS: Patient nonverbal        Vital Signs for last 24 hours  Temp:  [36.2 °C (97.1 °F)-37.3 °C (99.1 °F)] 36.2 °C (97.1 °F)  Pulse:  [45-78] 57  Resp:  [0-35] 17  BP: ()/(56-88) 125/71  SpO2:  [92 %-100 %] 94 %    Hemodynamic parameters for last 24 hours       Respiratory Data     Respiration: 17, Pulse Oximetry: 94 %             Physical Exam  Physical Exam  Constitutional:       General: He is not in acute distress.     Appearance: He is obese. He is not toxic-appearing.   HENT:      Head:      Comments: Dressings in place over left cranium     Right Ear: External ear normal.      Left Ear: External ear normal.      Nose: Nose normal.   Eyes:      General:         Right eye: No discharge.         Left eye: No discharge.      Pupils: Pupils are equal, round, and reactive to light.   Cardiovascular:      Rate and Rhythm: Normal rate and regular rhythm.      Pulses: Normal pulses.      Heart sounds: Normal heart sounds.   Pulmonary:      Effort: Pulmonary effort is normal. No respiratory distress.      Breath sounds: Normal breath sounds.   Abdominal:      General: There is no distension.      Palpations: Abdomen is soft.   Musculoskeletal:         General: Normal range of motion.      Cervical back: Neck supple. No rigidity.   Skin:     General: Skin is warm and dry.       Capillary Refill: Capillary refill takes less than 2 seconds.      Coloration: Skin is not jaundiced.      Findings: No bruising.   Neurological:      General: No focal deficit present.      Mental Status: He is oriented to person, place, and time.      Comments: Reported prior to surgery   Psychiatric:      Comments: Unable to assess         Laboratory  Recent Results (from the past 24 hour(s))   CBC with Differential: Tomorrow AM    Collection Time: 06/30/22  4:50 AM   Result Value Ref Range    WBC 12.0 (H) 4.8 - 10.8 K/uL    RBC 4.30 (L) 4.70 - 6.10 M/uL    Hemoglobin 13.1 (L) 14.0 - 18.0 g/dL    Hematocrit 38.0 (L) 42.0 - 52.0 %    MCV 88.4 81.4 - 97.8 fL    MCH 30.5 27.0 - 33.0 pg    MCHC 34.5 33.7 - 35.3 g/dL    RDW 41.9 35.9 - 50.0 fL    Platelet Count 193 164 - 446 K/uL    MPV 10.0 9.0 - 12.9 fL    Neutrophils-Polys 66.10 44.00 - 72.00 %    Lymphocytes 24.60 22.00 - 41.00 %    Monocytes 8.40 0.00 - 13.40 %    Eosinophils 0.40 0.00 - 6.90 %    Basophils 0.20 0.00 - 1.80 %    Immature Granulocytes 0.30 0.00 - 0.90 %    Nucleated RBC 0.00 /100 WBC    Neutrophils (Absolute) 7.91 (H) 1.82 - 7.42 K/uL    Lymphs (Absolute) 2.95 1.00 - 4.80 K/uL    Monos (Absolute) 1.01 (H) 0.00 - 0.85 K/uL    Eos (Absolute) 0.05 0.00 - 0.51 K/uL    Baso (Absolute) 0.02 0.00 - 0.12 K/uL    Immature Granulocytes (abs) 0.04 0.00 - 0.11 K/uL    NRBC (Absolute) 0.00 K/uL   Comp Metabolic Panel (CMP): Tomorrow AM    Collection Time: 06/30/22  4:50 AM   Result Value Ref Range    Sodium 139 135 - 145 mmol/L    Potassium 4.1 3.6 - 5.5 mmol/L    Chloride 108 96 - 112 mmol/L    Co2 21 20 - 33 mmol/L    Anion Gap 10.0 7.0 - 16.0    Glucose 114 (H) 65 - 99 mg/dL    Bun 10 8 - 22 mg/dL    Creatinine 0.84 0.50 - 1.40 mg/dL    Calcium 8.5 8.5 - 10.5 mg/dL    AST(SGOT) 16 12 - 45 U/L    ALT(SGPT) 31 2 - 50 U/L    Alkaline Phosphatase 53 30 - 99 U/L    Total Bilirubin 1.4 0.1 - 1.5 mg/dL    Albumin 3.7 3.2 - 4.9 g/dL    Total Protein 6.2 6.0 - 8.2  g/dL    Globulin 2.5 1.9 - 3.5 g/dL    A-G Ratio 1.5 g/dL   Magnesium: Every Monday and Thursday AM    Collection Time: 06/30/22  4:50 AM   Result Value Ref Range    Magnesium 1.8 1.5 - 2.5 mg/dL   Phosphorus: Every Monday and Thursday AM    Collection Time: 06/30/22  4:50 AM   Result Value Ref Range    Phosphorus 3.1 2.5 - 4.5 mg/dL   ESTIMATED GFR    Collection Time: 06/30/22  4:50 AM   Result Value Ref Range    GFR (CKD-EPI) 119 >60 mL/min/1.73 m 2       Fluids    Intake/Output Summary (Last 24 hours) at 6/30/2022 1639  Last data filed at 6/30/2022 1400  Gross per 24 hour   Intake 2760 ml   Output 3450 ml   Net -690 ml       Core Measures & Quality Metrics  Labs reviewed, Radiology images reviewed and Medications reviewed  Fuchs catheter: No Fuchs      DVT Prophylaxis: Contraindicated - High bleeding risk  DVT prophylaxis - mechanical: SCDs  Ulcer prophylaxis: Not indicated        CHICHI Score    ETOH Screening      Assessment/Plan  * Subdural hematoma (HCC)- (present on admission)  Assessment & Plan  1.4 cm left holohemispheric subdural hematoma with associated mass effect and 7 mm left to right midline shift.  6/29 craniotomy.  Post traumatic pharmacologic seizure prophylaxis for 1 week.  Speech Language Pathology cognitive evaluation.  Kirby Degroot MD. Neurosurgeon. [facility].    Contraindication to deep vein thrombosis (DVT) prophylaxis- (present on admission)  Assessment & Plan  Prophylactic anticoagulation for thrombotic prevention initially contraindicated secondary to elevated bleeding risk.  6/30 Trauma surveillance venous duplex scanning ordered.    Platelet dysfunction due to drugs- (present on admission)  Assessment & Plan  History of recent Ibuprofen use.  TEG with platelet mapping AA 24% and ADP 20.3% inhibition.    Trauma- (present on admission)  Assessment & Plan  Headaches.  Non Trauma Activation.  Carson Anna MD. Trauma Surgery.      Discussed patient condition with RN,  RT, Pharmacy, Dietary and .

## 2022-06-30 NOTE — CARE PLAN
The patient is Watcher - Medium risk of patient condition declining or worsening    Shift Goals  Clinical Goals: Stable neuro exam; mobility  Patient Goals: rest; eat real food  Family Goals: stable CT head    Progress made toward(s) clinical / shift goals:    Problem: Pain - Standard  Goal: Alleviation of pain or a reduction in pain to the patient’s comfort goal  Outcome: Progressing     Problem: Fall Risk  Goal: Patient will remain free from falls  Outcome: Progressing     Problem: Skin Integrity  Goal: Skin integrity is maintained or improved  Outcome: Progressing     Problem: Craniotomy Surgery  Goal: Post-Operative Craniotomy Surgery: Patient will achieve optimal post-surgical outcomes  Outcome: Progressing     Problem: Neuro Status  Goal: Neuro status will remain stable or improve  Outcome: Progressing     Problem: Knowledge Deficit - Neuro Surgical  Goal: Patient's understanding of spinal precautions, appropriate body mechanics and incision care will improve  Outcome: Progressing     Problem: Pain - Post Surgery  Goal: Alleviation or reduction of pain post surgery  Outcome: Progressing     Problem: Surgical Drain Management  Goal: Proper management/care of surgical drains will be maintained  Outcome: Progressing       Patient is not progressing towards the following goals:

## 2022-06-30 NOTE — THERAPY
Speech Language Pathology   Initial Assessment     Patient Name: Martín Ro  AGE:  32 y.o., SEX:  male  Medical Record #: [id number]  Today's Date: 6/30/2022     Precautions: Fall Risk    Patient is 32 y.o. male with SDH s/p crani for evacuation of SDH on 6/29/22.     CT of head 6/30/22:  1.  Interval evacuation of left holohemispheric subdural hematoma, 2.4 mm residual hyperdense hemorrhage along the left skull convexity is seen within expected limits for recent postop changes.  2.  New pneumocephalus.    Subjective: Patient received awake and consuming items from regular meal tray. Spouse reported no concerns for acute changes in mentation. Spouse and mother stepped out for session. Patient reportedly works full time in construction and was independent with IADLs. Pt reported feeling 100% in regards to thinking/mentation.      Assessment  Portions of the COGNISTAT (Neurobehavioral Cognitive Status Assessment) and other measures were administered.     Patient scored the following on the Cognistat:  Orientation: WNL  Attention: MILD-MODERATE  Comprehension (Language): WNL  Repetition (Language): WNL  Naming (Language): WNL  Memory: WNL  Calculations: WNL  Similarities (Reasoning): WNL  Judgment (Reasoning): WNL    During attention task, patient demonstrated some difficulty immediately recalling sequence of numbers but pt reported difficulty w/ numbers is baseline. Pt able to immediately recall full sentence without difficulty. Further testing revealed performance on clock drawing, medication management, and writing were WNL. Patient required verbal cue x1 to complete full written prompt during medication management task. Otherwise, performed WFL. Suspect some level of impairment with attention to task.     Recommendations  Initial close monitoring of IADLs (following d/c paperwork and managing bills/medication) to ensure safety. Patient may benefit from outpatient SLP services to address any residual, higher  level cognitive impairments or if pt feels he is not at his PLOF once d/c home.     Plan  Recommend Speech Therapy for Evaluation only for the following treatments:  Cognitive-Linguistic Training and Patient / Family / Caregiver Education.    Discharge Recommendations: Recommend outpatient speech therapy services       06/30/22 1351   Vitals   O2 Delivery Device Room air w/o2 available   Prior Level Of Function   Communication Within Functional Limits   Swallow Within Functional Limits   Hearing Within Functional Limits for Evaluation   Hearing Aid None   Patient's Primary Language English   Education Group   Education Provided Traumatic Brain Injury / Cognitive-Linguistic   TBI / Cog-Ling Patient Response Patient;Acceptance;Explanation;Verbal Demonstration;Handout;Family;Significant Other

## 2022-06-30 NOTE — DISCHARGE PLANNING
Met with patient, wife and mother at bedside. Work letters given to them per request. Wife inquired about FMLA for herself so she can take time off to help patient upon dc. KELSYSW educated her on the process. She plans to contact her HR department and start the process. She will bring in the paperwork once it's received. FOREST also provided e-mail address if her HR rep wants to e-mail paperwork to this writer. No additional needs at this time.

## 2022-06-30 NOTE — THERAPY
Occupational Therapy   Initial Evaluation     Patient Name: Martín Ro  Age:  32 y.o., Sex:  male  Medical Record #: [id number]  Today's Date: 6/30/2022     Precautions  Precautions: Fall Risk    Assessment  Patient is 32 y.o. male with a diagnosis of SDH, s/p crani.  Pt is at or near his/her functional baseline. Pt with no further skilled OT needs in the acute care setting at this time.        Plan    Occupational Therapy for Evaluation only      Discharge Recommendations: (P) Anticipate that the patient will have no further occupational therapy needs after discharge from the hospital          06/30/22 0754   Prior Living Situation   Prior Services None   Housing / Facility 2 Story House   Steps Into Home 1   Equipment Owned None   Lives with - Patient's Self Care Capacity Spouse  (can assist PRN)   Prior Level of ADL Function   Self Feeding Independent   Grooming / Hygiene Independent   Bathing Independent   Dressing Independent   Toileting Independent   ADL Assessment   Grooming Supervision   Upper Body Dressing Supervision   Lower Body Dressing Supervision   Toileting Supervision   Functional Mobility   Sit to Stand Supervised   Bed, Chair, Wheelchair Transfer Supervised   Anticipated Discharge Equipment and Recommendations   Discharge Recommendations Anticipate that the patient will have no further occupational therapy needs after discharge from the hospital

## 2022-06-30 NOTE — CARE PLAN
Problem: Knowledge Deficit - Standard  Goal: Patient and family/care givers will demonstrate understanding of plan of care, disease process/condition, diagnostic tests and medications  Outcome: Progressing     Problem: Pain - Standard  Goal: Alleviation of pain or a reduction in pain to the patient’s comfort goal  Outcome: Progressing     Problem: Fall Risk  Goal: Patient will remain free from falls  Outcome: Progressing     Problem: Skin Integrity  Goal: Skin integrity is maintained or improved  Outcome: Progressing     The patient is Watcher - Medium risk of patient condition declining or worsening    Shift Goals  Clinical Goals: Surgery, q1 neuro checks  Patient Goals: rest  Family Goals: NA    Progress made toward(s) clinical / shift goals:      Patient had surgery  Progressing with Q1 neuro checks  Patient is resting

## 2022-06-30 NOTE — THERAPY
Physical Therapy   Initial Evaluation     Patient Name: Martín Ro  Age:  32 y.o., Sex:  male  Medical Record #: [id number]  Today's Date: 6/30/2022     Precautions  Precautions: Fall Risk    Assessment  Mr. Ro is a 33 y/o male who presents to acute secondary to frontal headaches, was found to have SDH with midline shift. He is s/p L sided craniotomy and evacuation of SDH. Pt with LE strength equal bilaterally and WFL. No sensory impairments. Denied headache. Pt steady on feet. Was able to perform gait, transfers, and bed mobility without physical assist. Discussed recovery time s/p craniotomy and activity recommendations. Pt demonstrated understanding. No additional acute PT needs. Patient will not be actively followed for physical therapy services at this time, however may be seen if requested by physician for 1 more visit within 30 days to address any discharge or equipment needs.      Plan    Recommend Physical Therapy for Evaluation only    DC Equipment Recommendations: None  Discharge Recommendations: Recommend outpatient physical therapy services to address higher level deficits            Objective       06/30/22 0805   Prior Living Situation   Prior Services None   Housing / Facility 2 Story House   Steps Into Home 1   Steps In Home   (flight)   Rail Right Rail  (Steps in Home)   Equipment Owned None   Lives with - Patient's Self Care Capacity Spouse   Comments reports spouse can assist   Prior Level of Functional Mobility   Bed Mobility Independent   Transfer Status Independent   Ambulation Independent   Distance Ambulation (Feet)   (community ambulator)   Assistive Devices Used None   Stairs Independent   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   Cognition    Level of Consciousness Alert   Comments very pleasant and agreeable   Passive ROM Lower Body   Passive ROM Lower Body WDL   Active ROM Lower Body    Active ROM Lower Body  WDL   Strength Lower Body   Lower Body Strength   WDL   Sensation Lower Body   Lower Extremity Sensation   WDL   Balance Assessment   Sitting Balance (Static) Good   Sitting Balance (Dynamic) Good   Standing Balance (Static) Good   Standing Balance (Dynamic) Good   Weight Shift Sitting Good   Weight Shift Standing Good   Comments no AD   Gait Analysis   Gait Level Of Assist Supervised   Assistive Device None   Distance (Feet) 250   # of Times Distance was Traveled 1   Deviation Bradykinetic   Weight Bearing Status no restrictions   Bed Mobility    Supine to Sit Supervised   Sit to Supine Supervised   Scooting Supervised   Functional Mobility   Sit to Stand Supervised

## 2022-06-30 NOTE — DISCHARGE INSTR - SLP
Family to provide initial close monitoring of IADLs (following discharge instructions, managing/paying bills, managing medications). Patient may benefit from outpatient speech language pathology services if there are ongoing concerns for cognitive impairments (particularly attention and memory) or if pt is demonstrating difficulty performing previously performed tasks.    Progress Note - Palliative & Supportive Care  [place]  69 y o   male  /-01   MRN: [id number]  Encounter: [id number]     Assessment  Patient Active Problem List   Diagnosis    Hyperlipidemia    Essential hypertension    Type 2 diabetes mellitus with hyperglycemia, with long-term current use of insulin (HCC)    Gastroesophageal reflux disease    Pancreatic cyst    [facility]    Peripheral neuropathy    Bilateral cellulitis of lower leg    Hyponatremia    Severe protein-calorie malnutrition (HCC)    Anemia    Sacral wound    Leukocytosis    Generalized weakness    Streptococcal bacteremia    Mesenteric venous thrombosis ([facility])    GWEN (acute kidney injury) ([facility] )    SIRS (systemic inflammatory response syndrome) ([facility])    Goals of care, counseling/discussion      Active problems addressed:  · Pancreatic adenocarcinoma  · Generalized weakness  · Ambulatory dysfunction  · Mesenteric vein thrombosis  · Chronic Anemia  · HTN  · DM  · Peripheral neuropathy  · Constipation  · Depression  Goals of care    Goals of Care  Level 3 code status  · Disease focused care  Will continue discussions regarding [address] as patient's clinical presentation evolves  · Patient's sister-in-law Shari Alberto is requesting a family meeting  I was unable to contact her today  Left a message and will await for call back  · Encouraged follow up with Palliative Medicine on an outpatient basis after discharge for continued symptom management  Our office will contact patient to schedule a hospital follow up  Plan  Symptom Management  1   Symptom management -               - Constipation                          - Continue senna  BID                          - Continue Miralax    - PRN bisacodyl              - Depression                          - continue Lexapro              - Cancer pain                          - Tylenol 975 TID                          - Low dose PRN oxy (No doses used)    24 History  Chart reviewed before visit  Patient is sleeping when I enter however he is easily woken  He reports he is feeling better but "exhausted"  Has been eating well  Worked with PT/OT today  Continues to deny pain  He is agreeable to discussing goals of care but would prefer to do so when his family is here for support which is very understandable  Did reach out to patient's sister-in-law Stephen Shelton who is the primary contact however have not heard back from her yet  Will attempt to schedule a family meeting in the coming days  Review of Systems: Pertinent items are noted in HPI      Medications    Current Facility-Administered Medications:     acetaminophen (TYLENOL) tablet 975 mg, 975 mg, Oral, Q8H [facility], Jennifer P Bloch, CRNP, 975 mg at 07/16/21 0507    aspirin chewable tablet 81 mg, 81 mg, Oral, Daily, Rachael VAUGHN PA-C, 81 mg at 07/16/21 3370    atorvastatin (LIPITOR) tablet 40 mg, 40 mg, Oral, HS, Daily Smith MD, 40 mg at 07/15/21 2130    cefepime (MAXIPIME) IVPB (premix in dextrose) 2,000 mg 50 mL, 2,000 mg, Intravenous, Q12H, Rachael VAUGHN PA-C, Last Rate: 100 mL/hr at 07/16/21 0329, 2,000 mg at 07/16/21 0329    escitalopram (LEXAPRO) tablet 5 mg, 5 mg, Oral, Daily, Rachael VAUGHN PA-C, 5 mg at 07/16/21 7093    hydrALAZINE (APRESOLINE) injection 5 mg, 5 mg, Intravenous, Q6H PRN, [facility] Sara Robertson PA-C, 5 mg at 07/15/21 1420    insulin glargine (LANTUS) subcutaneous injection 8 Units 0 08 mL, 8 Units, Subcutaneous, HS, Rachael VAUGHN PA-C, 8 Units at 07/15/21 2130    insulin lispro (HumaLOG) 100 units/mL subcutaneous injection 1-5 Units, 1-5 Units, Subcutaneous, TID AC, 1 Units at 07/16/21 0835 **AND** Fingerstick Glucose (POCT), , , TID AC, Rachael VAUGHN PA-C    insulin lispro (HumaLOG) 100 units/mL subcutaneous injection 1-5 Units, 1-5 Units, Subcutaneous, HS, Rachael VAUGHN PA-C, 3 Units at 07/15/21 2130    insulin lispro (HumaLOG) 100 units/mL subcutaneous injection 5 Units, 5 Units, Subcutaneous, TID With Meals, Maged Sethi PA-C, 5 Units at 07/16/21 0837    oxyCODONE (ROXICODONE) IR tablet 2 5 mg, 2 5 mg, Oral, Q4H PRN, Maged Sethi PA-C    oxyCODONE (ROXICODONE) IR tablet 5 mg, 5 mg, Oral, Q4H PRN, Maged Sethi PA-C    polyethylene glycol (MIRALAX) packet 17 g, 17 g, Oral, Daily, OLIVIA Chanel-C, 17 g at 07/16/21 3146    prochlorperazine (COMPAZINE) tablet 10 mg, 10 mg, Oral, Q6H PRN, Tegan VAUGHN PA-C    rivaroxaban (XARELTO) tablet 15 mg, 15 mg, Oral, Daily With Dinner, Rachael VAUGHN PA-C, 15 mg at 07/15/21 1604    senna-docusate sodium (SENOKOT S) 8 6-50 mg per tablet 2 tablet, 2 tablet, Oral, BID, Amberly Haley DO, 2 tablet at 07/16/21 6053    sodium chloride tablet 1 g, 1 g, Oral, TID With Meals, Tegan VAUGHN PA-C, 1 g at 07/16/21 1940    vancomycin (VANCOCIN) IVPB (premix in dextrose) 750 mg 150 mL, 12 5 mg/kg, Intravenous, Q12H, Rachael VAUGHN PA-C, Last Rate: 150 mL/hr at 07/16/21 0330, 750 mg at 07/16/21 0330    Objective  /59 (BP Location: Left arm)   Pulse 89   Temp 98 °F (36 7 °C) (Oral)   Resp (!) 25   Ht 5' 10" (1 778 m)   Wt 66 7 kg (147 lb)   SpO2 95%   BMI 21 09 kg/m²   Physical Exam:   Constitutional: Appears ill, weak and frail  In no acute distress  Head: Normocephalic and atraumatic  Eyes: EOM are normal  No ocular discharge  No scleral icterus  Neck: no visible adenopathy or masses  Respiratory: Effort normal  No stridor  No respiratory distress  Gastrointestinal: No abdominal distension  Musculoskeletal: No edema  Neurological: easily woken and appropriately conversant  Skin: Dry, no diaphoresis  Psychiatric: Displays a normal mood and affect  Behavior, judgement and thought content appear normal      Lab Results:   I have personally reviewed pertinent labs  , CBC:   Lab Results   Component Value Date    WBC 10 77 (H) 07/16/2021    HGB 7 3 (L) 07/16/2021    HCT 21 3 (L) 07/16/2021     (H) 07/16/2021     07/16/2021    MCH 34 6 (H) 07/16/2021    MCHC 34 3 07/16/2021    RDW 17 0 (H) 07/16/2021    MPV 10 0 07/16/2021    NRBC 0 07/16/2021   , CMP:   Lab Results   Component Value Date    SODIUM 129 (L) 07/16/2021    K 4 0 07/16/2021     07/16/2021    CO2 21 07/16/2021    BUN 19 07/16/2021    CREATININE 0 65 07/16/2021    CALCIUM 7 4 (L) 07/16/2021    AST 10 07/16/2021    ALT 19 07/16/2021    ALKPHOS 64 07/16/2021    EGFR 95 07/16/2021       Counseling / Coordination of Care  Total floor / unit time spent today 25 minutes  Greater than 50% of total time was spent with the patient and / or family counseling and / or coordinating of care  A description of the counseling / coordination of care: provided medical updates, determined goals of care, discussed palliative care and symptom management, determined competency and POA/HCA, determined social/family support, provided psychosocial support  Reviewed with LISA TRIANA and CM      Leah Moctezuma, [address] [place] [address]

## 2022-06-30 NOTE — PROGRESS NOTES
Neurosurgery Progress Note    Subjective:  Pt seen in conjunction with Dr. Degroot  Pt awake, alert  No complaints     Exam:    A&O x4, GCS 15  PERRL, EOMI  Face symm, tongue midline  SCOTT with FS, no drift  Left incision intact with dressing, sd drain 290 cc overnight.       BP  Min: 99/57  Max: 141/82  Pulse  Av.2  Min: 45  Max: 84  Resp  Avg: 15.5  Min: 0  Max: 35  Temp  Av.6 °C (97.9 °F)  Min: 36.1 °C (97 °F)  Max: 37.3 °C (99.1 °F)  SpO2  Av.3 %  Min: 92 %  Max: 100 %    No data recorded    Recent Labs     22  0450   WBC 8.6 12.0*   RBC 4.99 4.30*   HEMOGLOBIN 14.9 13.1*   HEMATOCRIT 44.0 38.0*   MCV 88.2 88.4   MCH 29.9 30.5   MCHC 33.9 34.5   RDW 42.0 41.9   PLATELETCT 222 193   MPV 10.0 10.0     Recent Labs     22  0450   SODIUM 138 139   POTASSIUM 3.6 4.1   CHLORIDE 105 108   CO2 24 21   GLUCOSE 113* 114*   BUN 15 10   CREATININE 0.94 0.84   CALCIUM 9.2 8.5     Recent Labs     22  2335   INR 1.02     Recent Labs     229   REACTMIN 5.3   CLOTKINET 1.5   CLOTANGL 71.0   MAXCLOTS 53.8   JLR02ONF 0.7   PRCINADP 20.3*   PRCINAA 24.0*       Intake/Output                       22 0700 - 22 0659 22 07 - 22 0659     3096-2964 8639-0971 Total 7245-1986 0673-1576 Total                 Intake    P.O.  120  840 960  --  -- --    P.O. 120 840 960 -- -- --    I.V.  1650  1200 2850  200  -- 200    Volume (mL) (NS infusion) 650 1200 1850 200 -- 200    Volume (mL) (Lactated Ringers) 1000 -- 1000 -- -- --    Total Intake 1770 2040 3810 200 -- 200       Output    Urine  1745  1175 2920  450  -- 450    Urine 250 -- 250 -- -- --    Number of Times Voided 2 x -- 2 x -- -- --    Urine Void (mL) 900 -- 900 -- -- --    Output (mL) (Urethral Catheter Non-latex 16 Fr.) 595 1175 1770 450 -- 450    Drains  100  290 390  --  -- --    Output (mL) (Closed/Suction Drain Lateral;Left Scalp Anibal Pollard) 100 290 390 -- -- --    Stool  --  -- --   --  -- --    Number of Times Stooled 0 x 0 x 0 x -- -- --    Total Output 1845 1465 3310 450 -- 450       Net I/O     -75 575 500 -250 -- -250            Intake/Output Summary (Last 24 hours) at 6/30/2022 0854  Last data filed at 6/30/2022 0800  Gross per 24 hour   Intake 3810 ml   Output 3285 ml   Net 525 ml            • Respiratory Therapy Consult   Continuous RT   • ondansetron  4 mg Q4HRS PRN   • ondansetron  4 mg Q4HRS PRN   • docusate sodium  100 mg BID   • polyethylene glycol/lytes  1 Packet BID   • bisacodyl  10 mg Q24HRS PRN   • sodium phosphate  1 Each Once PRN   • NS   Continuous   • famotidine  20 mg BID    Or   • famotidine  20 mg BID   • levETIRAcetam  500 mg Q12HRS    Or   • levETIRAcetam (Keppra) IV  500 mg Q12HRS   • HYDROmorphone  0.5 mg Q3HRS PRN    Or   • oxyCODONE immediate-release  2.5 mg Q3HRS PRN    Or   • oxyCODONE immediate-release  5 mg Q3HRS PRN   • Pharmacy Consult Request  1 Each PHARMACY TO DOSE   • acetaminophen  1,000 mg Q6HRS    Followed by   • [START ON 7/4/2022] acetaminophen  1,000 mg Q6HRS PRN   • labetalol  10 mg Q HOUR PRN   • hydrALAZINE  10 mg Q HOUR PRN   • cloNIDine  0.1 mg Q4HRS PRN   • niCARdipine infusion  0-15 mg/hr Continuous       Assessment and Plan:  Hospital day # 2  POD # 1 left crani for saSDH  Prophylactic anticoagulation: no         Start date/time: tbd  PT/OT  Head ct this am shows postop changes  keppra 500 bid x 7d  Neuro checks q2 dayshift, q4 night shift  Cont icu care for today  Cont sd drain   gordon Dumont

## 2022-06-30 NOTE — DOCUMENTATION QUERY
"                                                                         [facility]                                                                       Query Response Note      PATIENT:               SUSAN REICH  ACCT #:                  [id number]  MRN:                     [id number]  :                      1990  ADMIT DATE:       2022 9:17 PM  DISCH DATE:          RESPONDING  PROVIDER #:        [id number]           QUERY TEXT:    Please clarify the midline shift and mass effect.    If you have any questions please contact:  Debbie Ignacio RN CDI [facility]  [email or web address]  Debbie Ignacio [address]      The patient's clinical indicators include:  32 year old male admitted with a SDH.     Mock \"Given his symptoms a  CT scan was obtained which showed a 1.4 cm subdural hematoma with 7 mm of midline shift, Neurosurgery was consulted who recommended admission to the Trauma ICU for further observation and possibly surgery.\"  \"1.4 cm left holohemispheric subdural hematoma with associated mass effect and 7 mm left to right midline shift.\"     Field \"1.4 cm left holohemispheric subdural hematoma with associated mass effect and 7 mm left to right midline shift\"     Zane \"Was doing cartwheels with some family about 2 weeks ago and soon thereafter had the squishy sensation in his head like a swimming sensation that over the past 2 weeks has progressed to intermittent headaches\"   CT \"1.4 cm left holohemispheric subdural hematoma wiht assoicated mass effect and 7mm left to right midline shift\"    Risk factors SDH with assoicated mass effect   Treatment:  CT, labs, Craniotomy  Options provided:   -- Traumatic midline shift with brain compression   -- Nontraumatic shift without brain compression   -- Other explanation, please explain further      Query created by: Debbie Ignacio on 2022 1:56 PM    RESPONSE TEXT:    Other explanation, please explain further          " Electronically signed by:  RENE MARTÍNEZ MD 6/29/2022 7:46 PM

## 2022-06-30 NOTE — DISCHARGE PLANNING
Care Transition Team Assessment    In the case of an emergency, pt's legal NOK is wife, Lynsey Ro [phone]      LMSW obtained the following information used in this assessment. Verified accuracy of facesheet. No ACP on file. Prior to current hospitalization, pt was completely independent in ADLS/IADLS. No prior services. PCP is Kalyan Loomis and pharmacy is [facility]. No SA or history of MH.     Cog eval pending.   Based on 6 clicks anticipate home with support.     Plan: Continue to assist with social/dc needs     Care Transition Team Assessment    Information Source  Orientation Level: Oriented X4  Information Given By: Patient, Other (Comments)  Informant's Name: EMR  Who is responsible for making decisions for patient? : Patient    Readmission Evaluation  Is this a readmission?: No    Elopement Risk  Legal Hold: No  Ambulatory or Self Mobile in Wheelchair: Yes  Disoriented: No  Psychiatric Symptoms: None  History of Wandering: No  Elopement this Admit: No  Vocalizing Wanting to Leave: No  Displays Behaviors, Body Language Wanting to Leave: No-Not at Risk for Elopement  Elopement Risk: Not at Risk for Elopement    Interdisciplinary Discharge Planning  Patient or legal guardian wants to designate a caregiver: No    Discharge Preparedness  What is your plan after discharge?: Uncertain - pending medical team collaboration  What are your discharge supports?: Spouse, Parent  Prior Functional Level: Ambulatory, Independent with Activities of Daily Living, Independent with Medication Management    Functional Assesment  Prior Functional Level: Ambulatory, Independent with Activities of Daily Living, Independent with Medication Management    Finances  Financial Barriers to Discharge: No  Prescription Coverage: Yes    Advance Directive  Advance Directive?: None    Domestic Abuse  Have you ever been the victim of abuse or violence?: No  Physical Abuse or Sexual Abuse: No  Verbal Abuse or Emotional Abuse:  No  Possible Abuse/Neglect Reported to:: Not Applicable    Psychological Assessment  History of Substance Abuse: None  History of Psychiatric Problems: No  Non-compliant with Treatment: No  Newly Diagnosed Illness: Yes    Discharge Risks or Barriers  Discharge risks or barriers?: Post-acute placement / services, Complex medical needs  Patient risk factors: Complex medical needs    Anticipated Discharge Information  Discharge Disposition: Discharged to home/self care

## 2022-07-01 ENCOUNTER — APPOINTMENT (OUTPATIENT)
Dept: RADIOLOGY | Facility: MEDICAL CENTER | Age: 32
DRG: 027 | End: 2022-07-01
Attending: SURGERY
Payer: COMMERCIAL

## 2022-07-01 LAB
ALBUMIN SERPL BCP-MCNC: 4.7 G/DL (ref 3.2–4.9)
ALBUMIN/GLOB SERPL: 1.5 G/DL
ALP SERPL-CCNC: 79 U/L (ref 30–99)
ALT SERPL-CCNC: 45 U/L (ref 2–50)
ANION GAP SERPL CALC-SCNC: 13 MMOL/L (ref 7–16)
AST SERPL-CCNC: 27 U/L (ref 12–45)
BASOPHILS # BLD AUTO: 0.3 % (ref 0–1.8)
BASOPHILS # BLD: 0.03 K/UL (ref 0–0.12)
BILIRUB SERPL-MCNC: 1.3 MG/DL (ref 0.1–1.5)
BUN SERPL-MCNC: 12 MG/DL (ref 8–22)
CALCIUM SERPL-MCNC: 9.5 MG/DL (ref 8.5–10.5)
CHLORIDE SERPL-SCNC: 103 MMOL/L (ref 96–112)
CO2 SERPL-SCNC: 22 MMOL/L (ref 20–33)
CREAT SERPL-MCNC: 0.79 MG/DL (ref 0.5–1.4)
EOSINOPHIL # BLD AUTO: 0.08 K/UL (ref 0–0.51)
EOSINOPHIL NFR BLD: 0.7 % (ref 0–6.9)
ERYTHROCYTE [DISTWIDTH] IN BLOOD BY AUTOMATED COUNT: 39.8 FL (ref 35.9–50)
GFR SERPLBLD CREATININE-BSD FMLA CKD-EPI: 122 ML/MIN/1.73 M 2
GLOBULIN SER CALC-MCNC: 3.1 G/DL (ref 1.9–3.5)
GLUCOSE SERPL-MCNC: 116 MG/DL (ref 65–99)
HCT VFR BLD AUTO: 44.6 % (ref 42–52)
HGB BLD-MCNC: 15.3 G/DL (ref 14–18)
IMM GRANULOCYTES # BLD AUTO: 0.05 K/UL (ref 0–0.11)
IMM GRANULOCYTES NFR BLD AUTO: 0.4 % (ref 0–0.9)
LYMPHOCYTES # BLD AUTO: 2.49 K/UL (ref 1–4.8)
LYMPHOCYTES NFR BLD: 21.9 % (ref 22–41)
MCH RBC QN AUTO: 29.9 PG (ref 27–33)
MCHC RBC AUTO-ENTMCNC: 34.3 G/DL (ref 33.7–35.3)
MCV RBC AUTO: 87.1 FL (ref 81.4–97.8)
MONOCYTES # BLD AUTO: 0.78 K/UL (ref 0–0.85)
MONOCYTES NFR BLD AUTO: 6.9 % (ref 0–13.4)
NEUTROPHILS # BLD AUTO: 7.95 K/UL (ref 1.82–7.42)
NEUTROPHILS NFR BLD: 69.8 % (ref 44–72)
NRBC # BLD AUTO: 0 K/UL
NRBC BLD-RTO: 0 /100 WBC
PLATELET # BLD AUTO: 204 K/UL (ref 164–446)
PMV BLD AUTO: 10.1 FL (ref 9–12.9)
POTASSIUM SERPL-SCNC: 3.8 MMOL/L (ref 3.6–5.5)
PROT SERPL-MCNC: 7.8 G/DL (ref 6–8.2)
RBC # BLD AUTO: 5.12 M/UL (ref 4.7–6.1)
SODIUM SERPL-SCNC: 138 MMOL/L (ref 135–145)
WBC # BLD AUTO: 11.4 K/UL (ref 4.8–10.8)

## 2022-07-01 PROCEDURE — A9270 NON-COVERED ITEM OR SERVICE: HCPCS | Performed by: NEUROLOGICAL SURGERY

## 2022-07-01 PROCEDURE — 770022 HCHG ROOM/CARE - ICU (200)

## 2022-07-01 PROCEDURE — A9270 NON-COVERED ITEM OR SERVICE: HCPCS | Performed by: SURGERY

## 2022-07-01 PROCEDURE — A9270 NON-COVERED ITEM OR SERVICE: HCPCS | Performed by: CLINICAL NURSE SPECIALIST

## 2022-07-01 PROCEDURE — 93970 EXTREMITY STUDY: CPT

## 2022-07-01 PROCEDURE — 85025 COMPLETE CBC W/AUTO DIFF WBC: CPT

## 2022-07-01 PROCEDURE — 700102 HCHG RX REV CODE 250 W/ 637 OVERRIDE(OP): Performed by: SURGERY

## 2022-07-01 PROCEDURE — 700102 HCHG RX REV CODE 250 W/ 637 OVERRIDE(OP): Performed by: NEUROLOGICAL SURGERY

## 2022-07-01 PROCEDURE — 80053 COMPREHEN METABOLIC PANEL: CPT

## 2022-07-01 PROCEDURE — 99232 SBSQ HOSP IP/OBS MODERATE 35: CPT | Performed by: SURGERY

## 2022-07-01 PROCEDURE — 700102 HCHG RX REV CODE 250 W/ 637 OVERRIDE(OP): Performed by: CLINICAL NURSE SPECIALIST

## 2022-07-01 RX ADMIN — OXYCODONE 2.5 MG: 5 TABLET ORAL at 02:10

## 2022-07-01 RX ADMIN — ACETAMINOPHEN 1000 MG: 500 TABLET, FILM COATED ORAL at 00:03

## 2022-07-01 RX ADMIN — POLYETHYLENE GLYCOL 3350 1 PACKET: 17 POWDER, FOR SOLUTION ORAL at 17:40

## 2022-07-01 RX ADMIN — FAMOTIDINE 20 MG: 20 TABLET ORAL at 17:40

## 2022-07-01 RX ADMIN — LEVETIRACETAM 500 MG: 500 TABLET, FILM COATED ORAL at 05:05

## 2022-07-01 RX ADMIN — DOCUSATE SODIUM 100 MG: 100 CAPSULE, LIQUID FILLED ORAL at 05:03

## 2022-07-01 RX ADMIN — ACETAMINOPHEN 1000 MG: 500 TABLET, FILM COATED ORAL at 17:40

## 2022-07-01 RX ADMIN — ACETAMINOPHEN 1000 MG: 500 TABLET, FILM COATED ORAL at 11:18

## 2022-07-01 RX ADMIN — FAMOTIDINE 20 MG: 20 TABLET ORAL at 05:03

## 2022-07-01 RX ADMIN — OXYCODONE 2.5 MG: 5 TABLET ORAL at 05:09

## 2022-07-01 RX ADMIN — ACETAMINOPHEN 1000 MG: 500 TABLET, FILM COATED ORAL at 05:03

## 2022-07-01 RX ADMIN — DOCUSATE SODIUM 100 MG: 100 CAPSULE, LIQUID FILLED ORAL at 17:40

## 2022-07-01 RX ADMIN — LEVETIRACETAM 500 MG: 500 TABLET, FILM COATED ORAL at 17:40

## 2022-07-01 ASSESSMENT — PAIN DESCRIPTION - PAIN TYPE
TYPE: ACUTE PAIN

## 2022-07-01 NOTE — CARE PLAN
The patient is Stable - Low risk of patient condition declining or worsening    Shift Goals  Clinical Goals: stable neuros q4; pain control  Patient Goals: rest  Family Goals: no family at bedside    Progress made toward(s) clinical / shift goals:    Problem: Knowledge Deficit - Standard  Goal: Patient and family/care givers will demonstrate understanding of plan of care, disease process/condition, diagnostic tests and medications  Outcome: Progressing      Problem: Pain - Standard  Goal: Alleviation of pain or a reduction in pain to the patient’s comfort goal  Outcome: Progressing -  no c/o pain     Problem: Fall Risk  Goal: Patient will remain free from falls  Outcome: Progressing - uses call light appropriately     Problem: Skin Integrity  Goal: Skin integrity is maintained or improved  Outcome: Progressing - patient frequently turn self from side to side.     Problem: Craniotomy Surgery  Goal: Post-Operative Craniotomy Surgery: Patient will achieve optimal post-surgical outcomes  Outcome: Progressing - dressing and site are CD&I     Problem: Neuro Status  Goal: Neuro status will remain stable or improve  Outcome: Progressing - pt remains intact.     Problem: Surgical Drain Management  Goal: Proper management/care of surgical drains will be maintained  Outcome: Progressing - pt is appropriate and aware of drain when moving.       Patient is not progressing towards the following goals:

## 2022-07-01 NOTE — PROGRESS NOTES
Neurosurgery Progress Note    Subjective:  Pt awake, alert  No complaints     Exam:    A&O x4, GCS 15  PERRL, EOMI  Face symm, tongue midline  SCOTT with FS, no drift  Left incision intact with dressing, sd drain 180 cc overnight      BP  Min: 116/57  Max: 147/79  Pulse  Av.6  Min: 54  Max: 84  Resp  Av.5  Min: 12  Max: 25  Temp  Av.4 °C (97.6 °F)  Min: 36 °C (96.8 °F)  Max: 36.7 °C (98 °F)  SpO2  Av.8 %  Min: 93 %  Max: 97 %    No data recorded    Recent Labs     22  23122  0450 22  045   WBC 8.6 12.0* 11.4*   RBC 4.99 4.30* 5.12   HEMOGLOBIN 14.9 13.1* 15.3   HEMATOCRIT 44.0 38.0* 44.6   MCV 88.2 88.4 87.1   MCH 29.9 30.5 29.9   MCHC 33.9 34.5 34.3   RDW 42.0 41.9 39.8   PLATELETCT 222 193 204   MPV 10.0 10.0 10.1     Recent Labs     22  23122  0450 22  0450   SODIUM 138 139 138   POTASSIUM 3.6 4.1 3.8   CHLORIDE 105 108 103   CO2 24 21 22   GLUCOSE 113* 114* 116*   BUN 15 10 12   CREATININE 0.94 0.84 0.79   CALCIUM 9.2 8.5 9.5     Recent Labs     22  2335   INR 1.02     Recent Labs     22   REACTMIN 5.3   CLOTKINET 1.5   CLOTANGL 71.0   MAXCLOTS 53.8   [id number] 0.7   PRCINADP 20.3*   PRCINAA 24.0*       Intake/Output                       22 - 22 - 22 Total  Total                 Intake    P.O.  --  240 240  --  -- --    P.O. -- 240 240 -- -- --    I.V.  400  -- 400  --  -- --    Volume (mL) (NS infusion) 400 -- 400 -- -- --    Other  --  480 480  --  -- --    Medications (PO/Enteral Liquids) -- 480 480 -- -- --    Total Intake  -- -- --       Output    Urine  2175  1100 3275  --  -- --    Number of Times Voided 3 x 3 x 6 x -- -- --    Urine Void (mL) 1425 1100 2525 -- -- --    Output (mL) ([REMOVED] Urethral Catheter Non-latex 16 Fr.) 750 -- 750 -- -- --    Drains  140  180 320  --  -- --    Output (mL) (Closed/Suction Drain  Lateral;Left Scalp Anibal Pollard) 140 180 320 -- -- --    Total Output 3014 1280 3815 -- -- --       Net I/O     -8182 -116 -4550 -- -- --            Intake/Output Summary (Last 24 hours) at 7/1/2022 0856  Last data filed at 7/1/2022 0600  Gross per 24 hour   Intake 920 ml   Output 3145 ml   Net -2225 ml            • Respiratory Therapy Consult   Continuous RT   • ondansetron  4 mg Q4HRS PRN   • ondansetron  4 mg Q4HRS PRN   • docusate sodium  100 mg BID   • polyethylene glycol/lytes  1 Packet BID   • bisacodyl  10 mg Q24HRS PRN   • sodium phosphate  1 Each Once PRN   • famotidine  20 mg BID    Or   • famotidine  20 mg BID   • levETIRAcetam  500 mg Q12HRS    Or   • levETIRAcetam (Keppra) IV  500 mg Q12HRS   • HYDROmorphone  0.5 mg Q3HRS PRN    Or   • oxyCODONE immediate-release  2.5 mg Q3HRS PRN    Or   • oxyCODONE immediate-release  5 mg Q3HRS PRN   • Pharmacy Consult Request  1 Each PHARMACY TO DOSE   • acetaminophen  1,000 mg Q6HRS    Followed by   • [START ON 7/4/2022] acetaminophen  1,000 mg Q6HRS PRN   • labetalol  10 mg Q HOUR PRN   • hydrALAZINE  10 mg Q HOUR PRN   • cloNIDine  0.1 mg Q4HRS PRN   • niCARdipine infusion  0-15 mg/hr Continuous       Assessment and Plan:  Hospital day # 3  POD # 2 left crani for saSDH  Prophylactic anticoagulation: no         Start date/time: tbd  PT/OT  Head ct yest am postop changes  keppra 500 bid x 7d  Neuro checks q2 dayshift, q4 night shift  Cont icu care until sd drain out   Cont sd drain

## 2022-07-01 NOTE — DISCHARGE PLANNING
Patient discussed in IDT rounds with Dr. Aguiar. Therapy's evaluated and recommend outpatient PT & SLP. Patient still has drain in place. No case management or discharge needs mentioned. LMSW will continue to follow and assist

## 2022-07-01 NOTE — PROGRESS NOTES
Trauma / Surgical Daily Progress Note    Date of Service  7/1/2022    Chief Complaint  32 y.o. male admitted 6/28/2022 with traumatic subdural hematoma    Interval Events  Seen by consulting services, recommendations noted and appreciated.  Subdural drain remains in place.  Worked with therapies, tolerating diet.     Review of Systems  Review of Systems     Vital Signs for last 24 hours  Temp:  [36 °C (96.8 °F)-36.7 °C (98 °F)] 36.4 °C (97.6 °F)  Pulse:  [54-86] 68  Resp:  [12-25] 17  BP: (116-135)/(57-85) 123/77  SpO2:  [93 %-96 %] 95 %    Hemodynamic parameters for last 24 hours       Respiratory Data     Respiration: 17, Pulse Oximetry: 95 %             Physical Exam  Physical Exam  Vitals and nursing note reviewed.   Constitutional:       Appearance: Normal appearance.   HENT:      Head:      Comments: Dressings and subdural drain in place     Right Ear: Tympanic membrane normal.      Left Ear: Tympanic membrane normal.      Nose: Nose normal.      Mouth/Throat:      Mouth: Mucous membranes are moist.   Eyes:      Conjunctiva/sclera: Conjunctivae normal.      Pupils: Pupils are equal, round, and reactive to light.   Cardiovascular:      Rate and Rhythm: Normal rate and regular rhythm.      Pulses: Normal pulses.      Heart sounds: Normal heart sounds.   Pulmonary:      Effort: Pulmonary effort is normal.      Breath sounds: Normal breath sounds.   Abdominal:      General: Abdomen is flat.      Palpations: Abdomen is soft.   Genitourinary:     Penis: Normal.    Musculoskeletal:         General: No swelling or tenderness. Normal range of motion.      Cervical back: Normal range of motion and neck supple.   Skin:     General: Skin is warm and dry.      Capillary Refill: Capillary refill takes less than 2 seconds.   Neurological:      General: No focal deficit present.      Mental Status: He is alert and oriented to person, place, and time.   Psychiatric:         Mood and Affect: Mood normal.         Behavior:  Behavior normal.         Laboratory  Recent Results (from the past 24 hour(s))   CBC with Differential: Tomorrow AM    Collection Time: 07/01/22  4:50 AM   Result Value Ref Range    WBC 11.4 (H) 4.8 - 10.8 K/uL    RBC 5.12 4.70 - 6.10 M/uL    Hemoglobin 15.3 14.0 - 18.0 g/dL    Hematocrit 44.6 42.0 - 52.0 %    MCV 87.1 81.4 - 97.8 fL    MCH 29.9 27.0 - 33.0 pg    MCHC 34.3 33.7 - 35.3 g/dL    RDW 39.8 35.9 - 50.0 fL    Platelet Count 204 164 - 446 K/uL    MPV 10.1 9.0 - 12.9 fL    Neutrophils-Polys 69.80 44.00 - 72.00 %    Lymphocytes 21.90 (L) 22.00 - 41.00 %    Monocytes 6.90 0.00 - 13.40 %    Eosinophils 0.70 0.00 - 6.90 %    Basophils 0.30 0.00 - 1.80 %    Immature Granulocytes 0.40 0.00 - 0.90 %    Nucleated RBC 0.00 /100 WBC    Neutrophils (Absolute) 7.95 (H) 1.82 - 7.42 K/uL    Lymphs (Absolute) 2.49 1.00 - 4.80 K/uL    Monos (Absolute) 0.78 0.00 - 0.85 K/uL    Eos (Absolute) 0.08 0.00 - 0.51 K/uL    Baso (Absolute) 0.03 0.00 - 0.12 K/uL    Immature Granulocytes (abs) 0.05 0.00 - 0.11 K/uL    NRBC (Absolute) 0.00 K/uL   Comp Metabolic Panel (CMP): Tomorrow AM    Collection Time: 07/01/22  4:50 AM   Result Value Ref Range    Sodium 138 135 - 145 mmol/L    Potassium 3.8 3.6 - 5.5 mmol/L    Chloride 103 96 - 112 mmol/L    Co2 22 20 - 33 mmol/L    Anion Gap 13.0 7.0 - 16.0    Glucose 116 (H) 65 - 99 mg/dL    Bun 12 8 - 22 mg/dL    Creatinine 0.79 0.50 - 1.40 mg/dL    Calcium 9.5 8.5 - 10.5 mg/dL    AST(SGOT) 27 12 - 45 U/L    ALT(SGPT) 45 2 - 50 U/L    Alkaline Phosphatase 79 30 - 99 U/L    Total Bilirubin 1.3 0.1 - 1.5 mg/dL    Albumin 4.7 3.2 - 4.9 g/dL    Total Protein 7.8 6.0 - 8.2 g/dL    Globulin 3.1 1.9 - 3.5 g/dL    A-G Ratio 1.5 g/dL   ESTIMATED GFR    Collection Time: 07/01/22  4:50 AM   Result Value Ref Range    GFR (CKD-EPI) 122 >60 mL/min/1.73 m 2       Fluids    Intake/Output Summary (Last 24 hours) at 7/1/2022 1302  Last data filed at 7/1/2022 1200  Gross per 24 hour   Intake 920 ml   Output 2939     Net -2015        Core Measures & Quality Metrics  Labs reviewed, Medications reviewed and Radiology images reviewed  Fuchs catheter: No Fuchs      DVT Prophylaxis: Contraindicated - High bleeding risk  DVT prophylaxis - mechanical: SCDs          CHICHI Score  ETOH Screening    Assessment/Plan  * Subdural hematoma (HCC)- (present on admission)  Assessment & Plan  1.4 cm left holohemispheric subdural hematoma with associated mass effect and 7 mm left to right midline shift.  6/29 craniotomy.  Post traumatic pharmacologic seizure prophylaxis for 1 week.  Speech Language Pathology cognitive evaluation.  Kirby Degroot MD. Neurosurgeon. [facility].    Contraindication to deep vein thrombosis (DVT) prophylaxis- (present on admission)  Assessment & Plan  Prophylactic anticoagulation for thrombotic prevention initially contraindicated secondary to elevated bleeding risk.  7/1 Trauma screening bilateral lower extremity venous duplex negative for above knee DVT.    Platelet dysfunction due to drugs- (present on admission)  Assessment & Plan  History of recent Ibuprofen use.  TEG with platelet mapping AA 24% and ADP 20.3% inhibition.    Trauma- (present on admission)  Assessment & Plan  Headaches.  Non Trauma Activation.  Carson Anna MD. Trauma Surgery.    Overall plan:  Therapies.  See above.  Awaiting removal of drain prior to ICU transfer.    Discussed patient condition with RN, RT, Pharmacy, Dietary and .

## 2022-07-01 NOTE — CARE PLAN
The patient is Watcher - Medium risk of patient condition declining or worsening    Shift Goals  Clinical Goals: stable neuro exam  Patient Goals: rest  Family Goals: unable to assess    Progress made toward(s) clinical / shift goals: Neuro checks performed per orders, documented via flowsheets. Patient able to communicate pain levels, appropriate pain medication on MAR.      Problem: Neuro Status  Goal: Neuro status will remain stable or improve  Outcome: Progressing     Problem: Pain - Post Surgery  Goal: Alleviation or reduction of pain post surgery  Outcome: Progressing    Patient is not progressing towards the following goals: n/a

## 2022-07-02 LAB
ALBUMIN SERPL BCP-MCNC: 4.4 G/DL (ref 3.2–4.9)
ALBUMIN/GLOB SERPL: 1.3 G/DL
ALP SERPL-CCNC: 94 U/L (ref 30–99)
ALT SERPL-CCNC: 65 U/L (ref 2–50)
ANION GAP SERPL CALC-SCNC: 12 MMOL/L (ref 7–16)
AST SERPL-CCNC: 40 U/L (ref 12–45)
BASOPHILS # BLD AUTO: 0.4 % (ref 0–1.8)
BASOPHILS # BLD: 0.04 K/UL (ref 0–0.12)
BILIRUB SERPL-MCNC: 1.1 MG/DL (ref 0.1–1.5)
BUN SERPL-MCNC: 17 MG/DL (ref 8–22)
CALCIUM SERPL-MCNC: 9.4 MG/DL (ref 8.5–10.5)
CHLORIDE SERPL-SCNC: 104 MMOL/L (ref 96–112)
CO2 SERPL-SCNC: 21 MMOL/L (ref 20–33)
CREAT SERPL-MCNC: 0.88 MG/DL (ref 0.5–1.4)
EOSINOPHIL # BLD AUTO: 0.11 K/UL (ref 0–0.51)
EOSINOPHIL NFR BLD: 1.2 % (ref 0–6.9)
ERYTHROCYTE [DISTWIDTH] IN BLOOD BY AUTOMATED COUNT: 38.7 FL (ref 35.9–50)
GFR SERPLBLD CREATININE-BSD FMLA CKD-EPI: 118 ML/MIN/1.73 M 2
GLOBULIN SER CALC-MCNC: 3.3 G/DL (ref 1.9–3.5)
GLUCOSE SERPL-MCNC: 108 MG/DL (ref 65–99)
HCT VFR BLD AUTO: 45.2 % (ref 42–52)
HGB BLD-MCNC: 15.8 G/DL (ref 14–18)
IMM GRANULOCYTES # BLD AUTO: 0.06 K/UL (ref 0–0.11)
IMM GRANULOCYTES NFR BLD AUTO: 0.6 % (ref 0–0.9)
LYMPHOCYTES # BLD AUTO: 2.45 K/UL (ref 1–4.8)
LYMPHOCYTES NFR BLD: 25.7 % (ref 22–41)
MCH RBC QN AUTO: 29.9 PG (ref 27–33)
MCHC RBC AUTO-ENTMCNC: 35 G/DL (ref 33.7–35.3)
MCV RBC AUTO: 85.4 FL (ref 81.4–97.8)
MONOCYTES # BLD AUTO: 0.73 K/UL (ref 0–0.85)
MONOCYTES NFR BLD AUTO: 7.6 % (ref 0–13.4)
NEUTROPHILS # BLD AUTO: 6.16 K/UL (ref 1.82–7.42)
NEUTROPHILS NFR BLD: 64.5 % (ref 44–72)
NRBC # BLD AUTO: 0 K/UL
NRBC BLD-RTO: 0 /100 WBC
PLATELET # BLD AUTO: 233 K/UL (ref 164–446)
PMV BLD AUTO: 10 FL (ref 9–12.9)
POTASSIUM SERPL-SCNC: 4.4 MMOL/L (ref 3.6–5.5)
PROT SERPL-MCNC: 7.7 G/DL (ref 6–8.2)
RBC # BLD AUTO: 5.29 M/UL (ref 4.7–6.1)
SODIUM SERPL-SCNC: 137 MMOL/L (ref 135–145)
WBC # BLD AUTO: 9.6 K/UL (ref 4.8–10.8)

## 2022-07-02 PROCEDURE — 99232 SBSQ HOSP IP/OBS MODERATE 35: CPT | Performed by: SURGERY

## 2022-07-02 PROCEDURE — 85025 COMPLETE CBC W/AUTO DIFF WBC: CPT

## 2022-07-02 PROCEDURE — 770001 HCHG ROOM/CARE - MED/SURG/GYN PRIV*

## 2022-07-02 PROCEDURE — 700102 HCHG RX REV CODE 250 W/ 637 OVERRIDE(OP): Performed by: CLINICAL NURSE SPECIALIST

## 2022-07-02 PROCEDURE — 80053 COMPREHEN METABOLIC PANEL: CPT

## 2022-07-02 PROCEDURE — A9270 NON-COVERED ITEM OR SERVICE: HCPCS | Performed by: NEUROLOGICAL SURGERY

## 2022-07-02 PROCEDURE — 700102 HCHG RX REV CODE 250 W/ 637 OVERRIDE(OP): Performed by: NEUROLOGICAL SURGERY

## 2022-07-02 PROCEDURE — A9270 NON-COVERED ITEM OR SERVICE: HCPCS | Performed by: CLINICAL NURSE SPECIALIST

## 2022-07-02 PROCEDURE — 700102 HCHG RX REV CODE 250 W/ 637 OVERRIDE(OP): Performed by: SURGERY

## 2022-07-02 PROCEDURE — A9270 NON-COVERED ITEM OR SERVICE: HCPCS | Performed by: SURGERY

## 2022-07-02 RX ADMIN — OXYCODONE 2.5 MG: 5 TABLET ORAL at 09:30

## 2022-07-02 RX ADMIN — OXYCODONE 2.5 MG: 5 TABLET ORAL at 12:55

## 2022-07-02 RX ADMIN — ACETAMINOPHEN 1000 MG: 500 TABLET, FILM COATED ORAL at 17:28

## 2022-07-02 RX ADMIN — LEVETIRACETAM 500 MG: 500 TABLET, FILM COATED ORAL at 05:33

## 2022-07-02 RX ADMIN — DOCUSATE SODIUM 100 MG: 100 CAPSULE, LIQUID FILLED ORAL at 05:33

## 2022-07-02 RX ADMIN — FAMOTIDINE 20 MG: 20 TABLET ORAL at 05:33

## 2022-07-02 RX ADMIN — OXYCODONE 2.5 MG: 5 TABLET ORAL at 20:45

## 2022-07-02 RX ADMIN — ACETAMINOPHEN 1000 MG: 500 TABLET, FILM COATED ORAL at 05:00

## 2022-07-02 RX ADMIN — ACETAMINOPHEN 1000 MG: 500 TABLET, FILM COATED ORAL at 12:00

## 2022-07-02 RX ADMIN — OXYCODONE 2.5 MG: 5 TABLET ORAL at 16:09

## 2022-07-02 RX ADMIN — LEVETIRACETAM 500 MG: 500 TABLET, FILM COATED ORAL at 17:29

## 2022-07-02 RX ADMIN — POLYETHYLENE GLYCOL 3350 1 PACKET: 17 POWDER, FOR SOLUTION ORAL at 05:33

## 2022-07-02 ASSESSMENT — FIBROSIS 4 INDEX: FIB4 SCORE: 0.66

## 2022-07-02 ASSESSMENT — PAIN DESCRIPTION - PAIN TYPE: TYPE: ACUTE PAIN

## 2022-07-02 NOTE — PROGRESS NOTES
Trauma / Surgical Daily Progress Note    Date of Service  7/2/2022    Chief Complaint  32 y.o. male admitted 6/28/2022 with traumatic subdural hematoma    Interval Events  Seen by consulting services, recommendations noted and appreciated.  Subdural drain removed this morning  Worked with therapies, tolerating diet.     Review of Systems  Review of Systems       Vital Signs for last 24 hours  Temp:  [36.1 °C (97 °F)-37.2 °C (99 °F)] 37.1 °C (98.7 °F)  Pulse:  [56-91] 82  Resp:  [17-36] 36  BP: (106-139)/(57-90) 126/89  SpO2:  [91 %-98 %] 97 %    Hemodynamic parameters for last 24 hours       Respiratory Data     Respiration: (!) 36, Pulse Oximetry: 97 %             Physical Exam  Physical Exam  Vitals and nursing note reviewed.   Constitutional:       Appearance: Normal appearance.   HENT:      Head:      Comments: Dressings in place     Right Ear: Tympanic membrane normal.      Left Ear: Tympanic membrane normal.      Nose: Nose normal.      Mouth/Throat:      Mouth: Mucous membranes are moist.   Eyes:      Conjunctiva/sclera: Conjunctivae normal.      Pupils: Pupils are equal, round, and reactive to light.   Cardiovascular:      Rate and Rhythm: Normal rate and regular rhythm.      Pulses: Normal pulses.      Heart sounds: Normal heart sounds.   Pulmonary:      Effort: Pulmonary effort is normal.      Breath sounds: Normal breath sounds.   Abdominal:      General: Abdomen is flat.      Palpations: Abdomen is soft.   Genitourinary:     Penis: Normal.    Musculoskeletal:         General: No swelling or tenderness. Normal range of motion.      Cervical back: Normal range of motion and neck supple.   Skin:     General: Skin is warm and dry.      Capillary Refill: Capillary refill takes less than 2 seconds.   Neurological:      General: No focal deficit present.      Mental Status: He is alert and oriented to person, place, and time.   Psychiatric:         Mood and Affect: Mood normal.         Behavior: Behavior  normal.         Laboratory  Recent Results (from the past 24 hour(s))   CBC with Differential: Tomorrow AM    Collection Time: 07/02/22  5:30 AM   Result Value Ref Range    WBC 9.6 4.8 - 10.8 K/uL    RBC 5.29 4.70 - 6.10 M/uL    Hemoglobin 15.8 14.0 - 18.0 g/dL    Hematocrit 45.2 42.0 - 52.0 %    MCV 85.4 81.4 - 97.8 fL    MCH 29.9 27.0 - 33.0 pg    MCHC 35.0 33.7 - 35.3 g/dL    RDW 38.7 35.9 - 50.0 fL    Platelet Count 233 164 - 446 K/uL    MPV 10.0 9.0 - 12.9 fL    Neutrophils-Polys 64.50 44.00 - 72.00 %    Lymphocytes 25.70 22.00 - 41.00 %    Monocytes 7.60 0.00 - 13.40 %    Eosinophils 1.20 0.00 - 6.90 %    Basophils 0.40 0.00 - 1.80 %    Immature Granulocytes 0.60 0.00 - 0.90 %    Nucleated RBC 0.00 /100 WBC    Neutrophils (Absolute) 6.16 1.82 - 7.42 K/uL    Lymphs (Absolute) 2.45 1.00 - 4.80 K/uL    Monos (Absolute) 0.73 0.00 - 0.85 K/uL    Eos (Absolute) 0.11 0.00 - 0.51 K/uL    Baso (Absolute) 0.04 0.00 - 0.12 K/uL    Immature Granulocytes (abs) 0.06 0.00 - 0.11 K/uL    NRBC (Absolute) 0.00 K/uL   Comp Metabolic Panel (CMP): Tomorrow AM    Collection Time: 07/02/22  5:30 AM   Result Value Ref Range    Sodium 137 135 - 145 mmol/L    Potassium 4.4 3.6 - 5.5 mmol/L    Chloride 104 96 - 112 mmol/L    Co2 21 20 - 33 mmol/L    Anion Gap 12.0 7.0 - 16.0    Glucose 108 (H) 65 - 99 mg/dL    Bun 17 8 - 22 mg/dL    Creatinine 0.88 0.50 - 1.40 mg/dL    Calcium 9.4 8.5 - 10.5 mg/dL    AST(SGOT) 40 12 - 45 U/L    ALT(SGPT) 65 (H) 2 - 50 U/L    Alkaline Phosphatase 94 30 - 99 U/L    Total Bilirubin 1.1 0.1 - 1.5 mg/dL    Albumin 4.4 3.2 - 4.9 g/dL    Total Protein 7.7 6.0 - 8.2 g/dL    Globulin 3.3 1.9 - 3.5 g/dL    A-G Ratio 1.3 g/dL   ESTIMATED GFR    Collection Time: 07/02/22  5:30 AM   Result Value Ref Range    GFR (CKD-EPI) 118 >60 mL/min/1.73 m 2       Fluids    Intake/Output Summary (Last 24 hours) at 7/2/2022 1137  Last data filed at 7/2/2022 0900  Gross per 24 hour   Intake 900 ml   Output 2450 ml   Net -1550 ml        Core Measures & Quality Metrics  Labs reviewed, Medications reviewed and Radiology images reviewed  Fuchs catheter: No Fuchs      DVT Prophylaxis: Contraindicated - High bleeding risk  DVT prophylaxis - mechanical: SCDs          CHICHI Score    ETOH Screening      Assessment/Plan  * Subdural hematoma (HCC)- (present on admission)  Assessment & Plan  1.4 cm left holohemispheric subdural hematoma with associated mass effect and 7 mm left to right midline shift.  6/29 Craniotomy.  Post traumatic pharmacologic seizure prophylaxis for 1 week.  Speech Language Pathology cognitive evaluation completed.  Kirby Degroot MD. Neurosurgeon. [facility].    Contraindication to deep vein thrombosis (DVT) prophylaxis- (present on admission)  Assessment & Plan  Prophylactic anticoagulation for thrombotic prevention initially contraindicated secondary to elevated bleeding risk.  7/1 Trauma screening bilateral lower extremity venous duplex negative for above knee DVT.    Platelet dysfunction due to drugs- (present on admission)  Assessment & Plan  History of recent Ibuprofen use.  TEG with platelet mapping AA 24% and ADP 20.3% inhibition.    Trauma- (present on admission)  Assessment & Plan  Headaches.  Non Trauma Activation.  Carson Anna MD. Trauma Surgery.  Overall plan:  Therapies.  See above.  Transfer to [facility]    Discussed patient condition with RN, RT, Pharmacy, Dietary and . And Dr. Degroot

## 2022-07-02 NOTE — PROGRESS NOTES
Neurosurgery Progress Note    Subjective:  Pt awake, alert  No complaints     Exam:    A&O x4, GCS 15  PERRL, EOMI  Face symm, tongue midline  SCOTT with FS, no drift  Left incision intact with dressing      BP  Min: 106/57  Max: 139/76  Pulse  Av.8  Min: 54  Max: 91  Resp  Av.9  Min: 13  Max: 36  Temp  Av.5 °C (97.7 °F)  Min: 36.1 °C (97 °F)  Max: 37.2 °C (99 °F)  SpO2  Av.2 %  Min: 91 %  Max: 98 %    No data recorded    Recent Labs     22  0450 22  0450 22  0530   WBC 12.0* 11.4* 9.6   RBC 4.30* 5.12 5.29   HEMOGLOBIN 13.1* 15.3 15.8   HEMATOCRIT 38.0* 44.6 45.2   MCV 88.4 87.1 85.4   MCH 30.5 29.9 29.9   MCHC 34.5 34.3 35.0   RDW 41.9 39.8 38.7   PLATELETCT 193 204 233   MPV 10.0 10.1 10.0     Recent Labs     22  0450 22  0450 22  0530   SODIUM 139 138 137   POTASSIUM 4.1 3.8 4.4   CHLORIDE 108 103 104   CO2 21 22 21   GLUCOSE 114* 116* 108*   BUN 10 12 17   CREATININE 0.84 0.79 0.88   CALCIUM 8.5 9.5 9.4               Intake/Output                       22 - 22 - 22 0659      Total  Total                 Intake    Other  600  400 1000  100  -- 100    Medications (PO/Enteral Liquids)  100 -- 100    Total Intake  100 -- 100       Output    Urine  1000  550 1550  --  -- --    Number of Times Voided 3 x -- 3 x -- -- --    Urine Void (mL) 8957 685 6329 -- -- --    Drains  200  220 420  80  -- 80    Output (mL) (Closed/Suction Drain Lateral;Left Scalp Anibal Pollard) 200 220 420 80 -- 80    Stool  --  -- --  --  -- --    Number of Times Stooled -- -- -- 0 x -- 0 x    Total Output 0980 905 8775 80 -- 80       Net I/O     -435 -241 -359 20 -- 20            Intake/Output Summary (Last 24 hours) at 2022 0820  Last data filed at 2022 0800  Gross per 24 hour   Intake 900 ml   Output 2050 ml   Net -1150 ml            • Respiratory Therapy Consult   Continuous RT    • ondansetron  4 mg Q4HRS PRN   • ondansetron  4 mg Q4HRS PRN   • docusate sodium  100 mg BID   • polyethylene glycol/lytes  1 Packet BID   • bisacodyl  10 mg Q24HRS PRN   • sodium phosphate  1 Each Once PRN   • famotidine  20 mg BID    Or   • famotidine  20 mg BID   • levETIRAcetam  500 mg Q12HRS    Or   • levETIRAcetam (Keppra) IV  500 mg Q12HRS   • HYDROmorphone  0.5 mg Q3HRS PRN    Or   • oxyCODONE immediate-release  2.5 mg Q3HRS PRN    Or   • oxyCODONE immediate-release  5 mg Q3HRS PRN   • Pharmacy Consult Request  1 Each PHARMACY TO DOSE   • acetaminophen  1,000 mg Q6HRS    Followed by   • [START ON 7/4/2022] acetaminophen  1,000 mg Q6HRS PRN   • labetalol  10 mg Q HOUR PRN   • hydrALAZINE  10 mg Q HOUR PRN   • cloNIDine  0.1 mg Q4HRS PRN   • niCARdipine infusion  0-15 mg/hr Continuous       Assessment and Plan:  Hospital day #4  POD #3 left crani for saSDH  Prophylactic anticoagulation: no         Start date/time: tbd  PT/OT  keppra 500 bid x 7d  D/c sd drain  Transfer to floor  Anticipate d/c home tomorrow

## 2022-07-03 ENCOUNTER — PHARMACY VISIT (OUTPATIENT)
Dept: PHARMACY | Facility: MEDICAL CENTER | Age: 32
End: 2022-07-03
Payer: COMMERCIAL

## 2022-07-03 VITALS
OXYGEN SATURATION: 96 % | SYSTOLIC BLOOD PRESSURE: 127 MMHG | HEIGHT: 70 IN | BODY MASS INDEX: 35.98 KG/M2 | DIASTOLIC BLOOD PRESSURE: 73 MMHG | WEIGHT: 251.32 LBS | RESPIRATION RATE: 16 BRPM | HEART RATE: 64 BPM | TEMPERATURE: 97.9 F

## 2022-07-03 PROBLEM — Z75.8 DISCHARGE PLANNING ISSUES: Status: ACTIVE | Noted: 2022-07-03

## 2022-07-03 PROBLEM — Z02.9 DISCHARGE PLANNING ISSUES: Status: ACTIVE | Noted: 2022-07-03

## 2022-07-03 LAB
ALBUMIN SERPL BCP-MCNC: 4.8 G/DL (ref 3.2–4.9)
ALBUMIN/GLOB SERPL: 1.5 G/DL
ALP SERPL-CCNC: 104 U/L (ref 30–99)
ALT SERPL-CCNC: 58 U/L (ref 2–50)
ANION GAP SERPL CALC-SCNC: 14 MMOL/L (ref 7–16)
AST SERPL-CCNC: 27 U/L (ref 12–45)
BASOPHILS # BLD AUTO: 0.2 % (ref 0–1.8)
BASOPHILS # BLD: 0.03 K/UL (ref 0–0.12)
BILIRUB SERPL-MCNC: 1 MG/DL (ref 0.1–1.5)
BUN SERPL-MCNC: 18 MG/DL (ref 8–22)
CALCIUM SERPL-MCNC: 9.8 MG/DL (ref 8.5–10.5)
CHLORIDE SERPL-SCNC: 101 MMOL/L (ref 96–112)
CO2 SERPL-SCNC: 23 MMOL/L (ref 20–33)
CREAT SERPL-MCNC: 0.96 MG/DL (ref 0.5–1.4)
EOSINOPHIL # BLD AUTO: 0.06 K/UL (ref 0–0.51)
EOSINOPHIL NFR BLD: 0.5 % (ref 0–6.9)
ERYTHROCYTE [DISTWIDTH] IN BLOOD BY AUTOMATED COUNT: 39.4 FL (ref 35.9–50)
GFR SERPLBLD CREATININE-BSD FMLA CKD-EPI: 108 ML/MIN/1.73 M 2
GLOBULIN SER CALC-MCNC: 3.3 G/DL (ref 1.9–3.5)
GLUCOSE SERPL-MCNC: 116 MG/DL (ref 65–99)
HCT VFR BLD AUTO: 45.8 % (ref 42–52)
HGB BLD-MCNC: 15.9 G/DL (ref 14–18)
IMM GRANULOCYTES # BLD AUTO: 0.05 K/UL (ref 0–0.11)
IMM GRANULOCYTES NFR BLD AUTO: 0.4 % (ref 0–0.9)
LYMPHOCYTES # BLD AUTO: 2.64 K/UL (ref 1–4.8)
LYMPHOCYTES NFR BLD: 20.6 % (ref 22–41)
MCH RBC QN AUTO: 29.8 PG (ref 27–33)
MCHC RBC AUTO-ENTMCNC: 34.7 G/DL (ref 33.7–35.3)
MCV RBC AUTO: 85.9 FL (ref 81.4–97.8)
MONOCYTES # BLD AUTO: 0.97 K/UL (ref 0–0.85)
MONOCYTES NFR BLD AUTO: 7.6 % (ref 0–13.4)
NEUTROPHILS # BLD AUTO: 9.07 K/UL (ref 1.82–7.42)
NEUTROPHILS NFR BLD: 70.7 % (ref 44–72)
NRBC # BLD AUTO: 0 K/UL
NRBC BLD-RTO: 0 /100 WBC
PLATELET # BLD AUTO: 263 K/UL (ref 164–446)
PMV BLD AUTO: 10 FL (ref 9–12.9)
POTASSIUM SERPL-SCNC: 4 MMOL/L (ref 3.6–5.5)
PROT SERPL-MCNC: 8.1 G/DL (ref 6–8.2)
RBC # BLD AUTO: 5.33 M/UL (ref 4.7–6.1)
SODIUM SERPL-SCNC: 138 MMOL/L (ref 135–145)
WBC # BLD AUTO: 12.8 K/UL (ref 4.8–10.8)

## 2022-07-03 PROCEDURE — 700102 HCHG RX REV CODE 250 W/ 637 OVERRIDE(OP): Performed by: NEUROLOGICAL SURGERY

## 2022-07-03 PROCEDURE — 80053 COMPREHEN METABOLIC PANEL: CPT

## 2022-07-03 PROCEDURE — A9270 NON-COVERED ITEM OR SERVICE: HCPCS | Performed by: CLINICAL NURSE SPECIALIST

## 2022-07-03 PROCEDURE — 700102 HCHG RX REV CODE 250 W/ 637 OVERRIDE(OP): Performed by: SURGERY

## 2022-07-03 PROCEDURE — 85025 COMPLETE CBC W/AUTO DIFF WBC: CPT

## 2022-07-03 PROCEDURE — 700102 HCHG RX REV CODE 250 W/ 637 OVERRIDE(OP): Performed by: CLINICAL NURSE SPECIALIST

## 2022-07-03 PROCEDURE — RXMED WILLOW AMBULATORY MEDICATION CHARGE

## 2022-07-03 PROCEDURE — 36415 COLL VENOUS BLD VENIPUNCTURE: CPT

## 2022-07-03 PROCEDURE — A9270 NON-COVERED ITEM OR SERVICE: HCPCS | Performed by: SURGERY

## 2022-07-03 PROCEDURE — A9270 NON-COVERED ITEM OR SERVICE: HCPCS | Performed by: NEUROLOGICAL SURGERY

## 2022-07-03 PROCEDURE — 99239 HOSP IP/OBS DSCHRG MGMT >30: CPT

## 2022-07-03 RX ORDER — LEVETIRACETAM 500 MG/1
500 TABLET ORAL EVERY 12 HOURS
Qty: 4 TABLET | Refills: 0 | Status: SHIPPED | OUTPATIENT
Start: 2022-07-03 | End: 2022-07-05

## 2022-07-03 RX ORDER — PSEUDOEPHEDRINE HCL 30 MG
100 TABLET ORAL 2 TIMES DAILY
Qty: 60 CAPSULE | COMMUNITY
Start: 2022-07-03

## 2022-07-03 RX ORDER — OXYCODONE HYDROCHLORIDE 5 MG/1
2.5-5 TABLET ORAL EVERY 4 HOURS PRN
Qty: 20 TABLET | Refills: 0 | Status: SHIPPED | OUTPATIENT
Start: 2022-07-03 | End: 2022-07-08

## 2022-07-03 RX ORDER — ACETAMINOPHEN 500 MG
1000 TABLET ORAL EVERY 6 HOURS
Qty: 30 TABLET | Refills: 0 | COMMUNITY
Start: 2022-07-03

## 2022-07-03 RX ADMIN — ACETAMINOPHEN 1000 MG: 500 TABLET, FILM COATED ORAL at 04:58

## 2022-07-03 RX ADMIN — ACETAMINOPHEN 1000 MG: 500 TABLET, FILM COATED ORAL at 00:39

## 2022-07-03 RX ADMIN — POLYETHYLENE GLYCOL 3350 1 PACKET: 17 POWDER, FOR SOLUTION ORAL at 04:58

## 2022-07-03 RX ADMIN — LEVETIRACETAM 500 MG: 500 TABLET, FILM COATED ORAL at 04:58

## 2022-07-03 RX ADMIN — OXYCODONE 2.5 MG: 5 TABLET ORAL at 01:19

## 2022-07-03 RX ADMIN — DOCUSATE SODIUM 100 MG: 100 CAPSULE, LIQUID FILLED ORAL at 04:58

## 2022-07-03 ASSESSMENT — PAIN DESCRIPTION - PAIN TYPE
TYPE: ACUTE PAIN
TYPE: ACUTE PAIN

## 2022-07-03 NOTE — PROGRESS NOTES
HD # 5 Headache, non trauma activation - subdural hematoma.    Adequate pain control.  Tolerating diet.  Last BM 7/3.  Ambulating.    A/Ox4.  Respiratory rate even and unlabored.  Abdomen soft.  Staples and suture to left side of head.  Improved speech according to wife.    Discussed prescriptions and follow up with patient and wife. They understand no aspirin or NSAIDs of any kind until they see Dr. Degroot. All questions answered.    Cleared for discharge home.

## 2022-07-03 NOTE — DISCHARGE INSTRUCTIONS
- Call or seek medical attention for questions or concerns  - Follow up with Dr. Anna as needed.  - Follow up with Dr. Degroot in 2 weeks time for suture and staple removal, avoid all blood thinners including aspirin or NSAIDs (ibuprophen, Advil, Aleve, Motrin) for at least two weeks  - Continue taking Keppra until completely finished with the medication.  - Follow up with primary care provider within one - two weeks time  - Resume regular diet  - May take over the counter acetaminophen as needed for pain  - Continue daily over the counter stool softener while on narcotics  - No operation of machinery or motorized vehicles while under the influence of narcotics  - No alcohol, marijuana or illicit drug use while under the influence of narcotics  - In the event of a narcotic overdose naloxone (Narcan) is available without a prescription from any [facility] or [facility]  - No swimming, hot tubs, baths or wound submersion until cleared by outpatient provider. May shower  - No contact sports, strenuous activities, or heavy lifting until cleared by outpatient provider  - If respiratory decompensation, persistent or worsening pain, neurological symptoms, or signs or symptoms of infection occur seek medical attention

## 2022-07-03 NOTE — CARE PLAN
The patient is Stable - Low risk of patient condition declining or worsening    Shift Goals  Clinical Goals: monitor neuro status, pain management  Patient Goals: pain management  Family Goals: get better    Progress made toward(s) clinical / shift goals:    Problem: Pain - Standard  Goal: Alleviation of pain or a reduction in pain to the patient’s comfort goal  Outcome: Progressing   PRN medications given as ordered.  Problem: Fall Risk  Goal: Patient will remain free from falls  Outcome: Progressing   Patient is no risk on screening. Patient calls appropriately.    Patient is not progressing towards the following goals:

## 2022-07-03 NOTE — DISCHARGE SUMMARY
Trauma Discharge Summary    DATE OF ADMISSION: 6/28/2022    DATE OF DISCHARGE: 7/3/2022    LENGTH OF STAY: 4 days    ATTENDING PHYSICIAN: Carson Anna M.D.    CONSULTING PHYSICIAN:   1. Dr. Carlos Manuel Degroot MD., Neurosurgery.    DISCHARGE DIAGNOSIS:  Principal Problem:    Subdural hematoma (HCC) POA: Yes  Active Problems:    Contraindication to deep vein thrombosis (DVT) prophylaxis POA: Yes    Discharge planning issues POA: Yes    Trauma POA: Yes    Platelet dysfunction due to drugs POA: Yes  Resolved Problems:    Subdural hematoma (HCC) POA: Yes      PROCEDURES:  1. Date of procedure 6/29/2022, procedure performed by Dr. Degroot, Neurosurgery.  - Left-sided craniotomy greater than 10 cm for evacuation of   subdural hematoma.    HISTORY OF PRESENT ILLNESS: The patient is a 32 y.o. male who reportedly had 1 week of frontal headaches.  He was taking Motrin for his pain without improvement.  He denies nausea vomiting but notes to be dizzy.  He does not recall striking his head prior to the onset of his symptoms. He was transferred to [facility] in [place], [place].    HOSPITAL COURSE: The patient was triaged as a non-trauma activation.  Head CT demonstrated a subdural hematoma with a 7 mm midline shift.  Dr. Degroot was consulted for this injury and recommended he be transferred to the trauma ICU, neurochecks every 1 hour and seizure prophylaxis. The patient was transported to trauma intensive care unit.  Dr. Degroot took the patient to the operating suite for a left-sided craniotomy and evacuation of subdural hematoma.  Follow-up head CT was stable.    He was seen by physical and occupational therapy.  They recommended outpatient physical therapy to address higher level of deficits.  He was also seen by speech for a cognitive evaluation.  It was recommended that the patient have initial close monitoring of ADLs and may benefit from outpatient speech services to address any residual or higher level  cognitive impairments. This was all discussed with the patient and wife.    On the day of discharge, the patient was a Fanny Coma Score 15 with no focal neurological findings.  He had adequate pain control.  He was afebrile and nontoxic in appearance.  He was ambulatory and tolerating a regular diet.    HOSPITAL PROBLEM LIST:  * Subdural hematoma (HCC)- (present on admission)  Assessment & Plan  1.4 cm left holohemispheric subdural hematoma with associated mass effect and 7 mm left to right midline shift.  6/29 Craniotomy.  7/2 Subdural drain removed.  Post traumatic pharmacologic seizure prophylaxis for 1 week.  Speech Language Pathology cognitive evaluation completed. Recommends initial close monitoring of IADLs.   Kirby Degroot MD. Neurosurgeon. [facility].    Discharge planning issues- (present on admission)  Assessment & Plan  Date of admission: 6/28/2022.  7/2 Transfer orders from SICU.  Cleared for discharge: Yes - Date: 7/3.  Discharge delayed: No.  Discharge date: tbd.    Contraindication to deep vein thrombosis (DVT) prophylaxis- (present on admission)  Assessment & Plan  Prophylactic anticoagulation for thrombotic prevention initially contraindicated secondary to elevated bleeding risk.  7/1 Trauma screening bilateral lower extremity venous duplex negative for above knee DVT.    Platelet dysfunction due to drugs- (present on admission)  Assessment & Plan  History of recent Ibuprofen use.  TEG with platelet mapping AA 24% and ADP 20.3% inhibition.    Trauma- (present on admission)  Assessment & Plan  Headaches.  Non Trauma Activation.  Carson Anna MD. Trauma Surgery.        DISPOSITION: Discharged home in stable condition on 7/3/2022. The patient and family were counseled and questions were answered. Specifically, signs and symptoms of infection, respiratory decompensation, neurological decompensation and persistent or worsening pain were discussed and the patient agrees to seek  medical attention if any of these develop.    DISCHARGE MEDICATIONS:  The patients controlled substance history was reviewed and a controlled substance use informed consent (if applicable) was provided by [facility] and the patient has been prescribed.     Medication List      START taking these medications      Instructions   acetaminophen 500 MG Tabs  Commonly known as: TYLENOL   Take 2 Tablets by mouth every 6 hours.  Dose: 1,000 mg     docusate sodium 100 MG Caps   Take 100 mg by mouth 2 times a day. Take while on narcotics.  Dose: 100 mg     levETIRAcetam 500 MG Tabs  Commonly known as: KEPPRA   Take 1 Tablet by mouth every 12 hours for 2 days.  Dose: 500 mg     oxyCODONE immediate-release 5 MG Tabs  Commonly known as: ROXICODONE   Take 0.5-1 Tablets by mouth every four hours as needed for Severe Pain for up to 5 days.  Dose: 2.5-5 mg            ACTIVITY:  Activity as tolerated.    WOUND CARE:  Staple/suture to be removed in 2 weeks by Dr. Degroot.  Keep area clean and dry. May shower.    DIET:  Orders Placed This Encounter   Procedures   • Diet Order Diet: Regular     Standing Status:   Standing     Number of Occurrences:   1     Order Specific Question:   Diet:     Answer:   Regular [1]       FOLLOW UP:  Kirby Degroot M.D.  [address]  [place] [address] [phone]    Follow up in 2 week(s)  For recheck and staple/suture removal.    Carson Anna M.D.  [address]  [address]  [place] [id number] [phone]    Follow up  As needed      TIME SPENT ON DISCHARGE: 35 minutes      ____________________________________________  Glo Correa D.N.P.    DD: 7/3/2022 9:15 AM

## 2022-07-03 NOTE — PROGRESS NOTES
Neurosurgery Progress Note    Subjective:  Pt looks very good this morning sitting upright at the side of bed waiting for examiner he would like to go home if possible today    Exam:    A&O x4, GCS 15  PERRL, EOMI  Face symm, tongue midline  SCOTT with FS, no drift  Left incision intact with dressing      BP  Min: 110/65  Max: 134/81  Pulse  Av.5  Min: 57  Max: 84  Resp  Av.7  Min: 16  Max: 18  Temp  Av.9 °C (98.4 °F)  Min: 36.4 °C (97.5 °F)  Max: 37.6 °C (99.7 °F)  SpO2  Av.4 %  Min: 92 %  Max: 97 %    No data recorded    Recent Labs     22  04522  0522  0357   WBC 11.4* 9.6 12.8*   RBC 5.12 5.29 5.33   HEMOGLOBIN 15.3 15.8 15.9   HEMATOCRIT 44.6 45.2 45.8   MCV 87.1 85.4 85.9   MCH 29.9 29.9 29.8   MCHC 34.3 35.0 34.7   RDW 39.8 38.7 39.4   PLATELETCT 204 233 263   MPV 10.1 10.0 10.0     Recent Labs     22  04522  0530 22  0357   SODIUM 138 137 138   POTASSIUM 3.8 4.4 4.0   CHLORIDE 103 104 101   CO2 22 21 23   GLUCOSE 116* 108* 116*   BUN 12 17 18   CREATININE 0.79 0.88 0.96   CALCIUM 9.5 9.4 9.8               Intake/Output                       22 - 2259 22 - 22 0659      Total  Total                 Intake    Other  300  -- 300  --  -- --    Medications (PO/Enteral Liquids) 300 -- 300 -- -- --    Total Intake 300 -- 300 -- -- --       Output    Urine  700  -- 700  --  -- --    Number of Times Voided 6 x 1 x 7 x -- -- --    Urine Void (mL) 700 -- 700 -- -- --    Drains  80  -- 80  --  -- --    Output (mL) ([REMOVED] Closed/Suction Drain Lateral;Left Scalp Anibal Pollard) 80 -- 80 -- -- --    Stool  --  -- --  --  -- --    Number of Times Stooled 1 x -- 1 x -- -- --    Total Output 780 -- 780 -- -- --       Net I/O     -480 -- -480 -- -- --            Intake/Output Summary (Last 24 hours) at 7/3/2022 0901  Last data filed at 2022 1800  Gross per 24 hour   Intake 200 ml    Output 300 ml   Net -100 ml            • Respiratory Therapy Consult   Continuous RT   • ondansetron  4 mg Q4HRS PRN   • ondansetron  4 mg Q4HRS PRN   • docusate sodium  100 mg BID   • polyethylene glycol/lytes  1 Packet BID   • bisacodyl  10 mg Q24HRS PRN   • sodium phosphate  1 Each Once PRN   • levETIRAcetam  500 mg Q12HRS    Or   • levETIRAcetam (Keppra) IV  500 mg Q12HRS   • oxyCODONE immediate-release  2.5 mg Q3HRS PRN    Or   • oxyCODONE immediate-release  5 mg Q3HRS PRN   • Pharmacy Consult Request  1 Each PHARMACY TO DOSE   • acetaminophen  1,000 mg Q6HRS    Followed by   • [START ON 7/4/2022] acetaminophen  1,000 mg Q6HRS PRN       Assessment and Plan:  Hospital day #5  POD #4 left crani for saSDH  Prophylactic anticoagulation: no         Start date/time: tbd  PT/OT  keppra 500 bid x 7d  Stable on floor last 24 hours  Okay to discharge home with 2-week follow-up for staple removal in clinic     T[place]orac Pregnancy And Lactation Text: This medication is not safe during pregnancy. It is unknown if this medication is excreted in breast milk.

## 2022-07-03 NOTE — PROGRESS NOTES
4 Eyes Skin Assessment Completed by CHRIS Hernandez and CHRIS Mionr.    Head Incision (crani site closed with staples and drain site closed with sutures)  Ears WDL  Nose WDL  Mouth WDL  Neck WDL  Breast/Chest WDL  Shoulder Blades WDL  Spine WDL  (R) Arm/Elbow/Hand WDL  (L) Arm/Elbow/Hand WDL  Abdomen WDL  Groin WDL  Scrotum/Coccyx/Buttocks WDL  (R) Leg Scab  (L) Leg Scratch  (R) Heel/Foot/Toe WDL  (L) Heel/Foot/Toe WDL          Devices In Places Pulse Ox      Interventions In Place Pillows and Pressure Redistribution Mattress    Possible Skin Injury No    Pictures Uploaded Into Epic N/A  Wound Consult Placed N/A  RN Wound Prevention Protocol Ordered No

## 2022-07-03 NOTE — ASSESSMENT & PLAN NOTE
Date of admission: 6/28/2022.  7/2 Transfer orders from SICU.  Cleared for discharge: Yes - Date: 7/3.  Discharge delayed: No.  Discharge date: tbd.

## 2022-07-03 NOTE — PROGRESS NOTES
Patient transported to s196 with all belongings in room, wife accompanying. Report called to Larry PEREZ, all questions answered.

## 2022-07-13 ENCOUNTER — HOSPITAL ENCOUNTER (OUTPATIENT)
Dept: RADIOLOGY | Facility: MEDICAL CENTER | Age: 32
End: 2022-07-13
Attending: NURSE PRACTITIONER
Payer: COMMERCIAL

## 2022-07-13 DIAGNOSIS — S06.5X0A TRAUMATIC SUBDURAL HEMATOMA WITHOUT LOSS OF CONSCIOUSNESS, INITIAL ENCOUNTER (HCC): ICD-10-CM

## 2022-07-13 PROCEDURE — 70450 CT HEAD/BRAIN W/O DYE: CPT

## 2022-08-15 ENCOUNTER — HOSPITAL ENCOUNTER (OUTPATIENT)
Dept: RADIOLOGY | Facility: MEDICAL CENTER | Age: 32
End: 2022-08-15
Attending: NURSE PRACTITIONER
Payer: COMMERCIAL

## 2022-08-15 DIAGNOSIS — S06.5X0A TRAUMATIC SUBDURAL HEMORRHAGE WITHOUT LOSS OF CONSCIOUSNESS, INITIAL ENCOUNTER (HCC): ICD-10-CM

## 2022-08-15 PROCEDURE — 70450 CT HEAD/BRAIN W/O DYE: CPT

## 2024-11-20 ENCOUNTER — OFFICE VISIT (OUTPATIENT)
Dept: URGENT CARE | Facility: PHYSICIAN GROUP | Age: 34
End: 2024-11-20
Payer: COMMERCIAL

## 2024-11-20 VITALS
HEIGHT: 71 IN | WEIGHT: 236.4 LBS | OXYGEN SATURATION: 99 % | SYSTOLIC BLOOD PRESSURE: 118 MMHG | HEART RATE: 104 BPM | DIASTOLIC BLOOD PRESSURE: 84 MMHG | TEMPERATURE: 97.1 F | RESPIRATION RATE: 20 BRPM | BODY MASS INDEX: 33.1 KG/M2

## 2024-11-20 DIAGNOSIS — K52.9 GASTROENTERITIS: ICD-10-CM

## 2024-11-20 PROCEDURE — 99212 OFFICE O/P EST SF 10 MIN: CPT | Performed by: NURSE PRACTITIONER

## 2024-11-20 PROCEDURE — 3074F SYST BP LT 130 MM HG: CPT | Performed by: NURSE PRACTITIONER

## 2024-11-20 PROCEDURE — 3079F DIAST BP 80-89 MM HG: CPT | Performed by: NURSE PRACTITIONER

## 2024-11-20 RX ORDER — ONDANSETRON 4 MG/1
4 TABLET, ORALLY DISINTEGRATING ORAL EVERY 6 HOURS PRN
Qty: 15 TABLET | Refills: 0 | Status: SHIPPED | OUTPATIENT
Start: 2024-11-20

## 2024-11-20 NOTE — PROGRESS NOTES
Verbal consent was acquired by the patient to use [employer] ambient listening note generation during this visit     Date: 11/20/24        Chief Complaint   Patient presents with    Fever     nausea,diarrhea,stomach pain,headache,x3 days          History of Present Illness  The patient is a 34-year-old male who presents for evaluation of diarrhea.    He reports experiencing diarrhea without any accompanying vomiting. His symptoms began on Monday afternoon, with the condition being at its worst yesterday, but showing slight improvement today. He has frequent bowel movements but no abdominal pain. He mentions having high fevers and a mild headache, feeling very thirsty, and experiencing headaches when he has a fever. He has been able to consume water and food, but these are quickly followed by trips to the bathroom.     He suspects he may have contracted food poisoning or a viral infection. He reports no presence of blood or mucus in his stool. He consumed soda yesterday and does not currently feel nauseous. He has not taken Zofran before.         ROS:    No severe shortness of breath   No Cardiac like chest pain, as discussed   As otherwise stated in HPI        Pertinent Medications:   Martín Ro  Current Outpatient Medications on File Prior to Visit   Medication Sig Dispense Refill    acetaminophen (TYLENOL) 500 MG Tab Take 2 Tablets by mouth every 6 hours. (Patient not taking: Reported on 11/20/2024) 30 Tablet 0    docusate sodium 100 MG Cap Take 100 mg by mouth 2 times a day. Take while on narcotics. (Patient not taking: Reported on 11/20/2024) 60 Capsule      No current facility-administered medications on file prior to visit.        Allergies:  Martín Ro Patient has no known allergies.   Problem List:   Martín Ro does not have any pertinent problems on file.    Surgical History:   Past Surgical History:   Procedure Laterality Date    CRANIOTOMY Left 6/29/2022    Procedure:  CRANIOTOMY FOR SUBDURAL HEMATOMA;  Surgeon: Kirby Degroot M.D.;  Location: SURGERY [facility];  Service: Neurosurgery    CIRCUMCISION ADULT  4/25/2011    Performed by ESTEFANI ROCHE at SURGERY [facility]       Past Social Hx:Martín Ro  reports that he has never smoked. He has never used smokeless tobacco. He reports current alcohol use. He reports that he does not use drugs.     Past Family Hx: Martín Ro family history is not on file.     Problem list, medications, and allergies reviewed by myself today in Epic     Physical Exam:    Vitals:    11/20/24 0954   BP: 118/84   Pulse: (!) 104   Resp: 20   Temp: 36.2 °C (97.1 °F)   SpO2: 99%               Physical Exam  Vitals and nursing note reviewed.   Constitutional:       General: He is not in acute distress.     Appearance: Normal appearance. He is ill-appearing (mildly). He is not toxic-appearing.   HENT:      Head: Normocephalic and atraumatic.      Right Ear: Tympanic membrane and ear canal normal.      Left Ear: Tympanic membrane and ear canal normal.      Nose: Nose normal.      Mouth/Throat:      Mouth: Mucous membranes are moist.      Pharynx: Oropharynx is clear.   Eyes:      Extraocular Movements: Extraocular movements intact.      Conjunctiva/sclera: Conjunctivae normal.      Pupils: Pupils are equal, round, and reactive to light.   Cardiovascular:      Rate and Rhythm: Tachycardia present.      Pulses: Normal pulses.      Heart sounds: Normal heart sounds.   Pulmonary:      Effort: Pulmonary effort is normal.      Breath sounds: Normal breath sounds.   Abdominal:      General: Abdomen is flat. There is no distension.      Palpations: Abdomen is soft.      Tenderness: There is no abdominal tenderness.   Musculoskeletal:         General: Normal range of motion.      Cervical back: Normal range of motion and neck supple.   Skin:     General: Skin is warm and dry.   Neurological:      General: No focal deficit present.       Mental Status: He is alert and oriented to person, place, and time.   Psychiatric:         Mood and Affect: Mood normal.         Behavior: Behavior normal.         Thought Content: Thought content normal.       Physical Exam  Vital Signs  Heart rate is 104.    Diagnostics:    Results      Medical Decision making and plan :  1. Gastroenteritis  - ondansetron (ZOFRAN ODT) 4 MG TABLET DISPERSIBLE; Take 1 Tablet by mouth every 6 hours as needed for Nausea/Vomiting for up to 15 doses.  Dispense: 15 Tablet; Refill: 0      Pleasant 34 y.o. male presented clinic with:     Assessment & Plan  1. Viral Gastroenteritis.  Symptoms of fever and diarrhea suggest a viral etiology rather than a foodborne one. Despite a slightly elevated heart rate, he does not appear dehydrated. The absence of abdominal pain is a positive sign. His condition seems to be improving, which is encouraging. A prescription for Zofran will be provided. He is advised to maintain a bland diet for 3 to 5 days, including foods such as bananas, rice, applesauce, and toast. Hydration is crucial, but he should avoid red Gatorade and red dye number 5. Rest is recommended, and he should take precautions to prevent spreading the illness to his children. Hand hygiene and avoiding nail-biting are important. Over-the-counter Imodium can be used if diarrhea persists for 5 days. For headaches, alternating between Tylenol and ibuprofen is suggested. A work note will be provided for 4 days, allowing him to return to work on Sunday. If symptoms persist or worsen over a week, further medical evaluation will be necessary.    Declined point-of-care testing.  I personally reviewed prior external notes and test results pertinent to today's visit. Pt is clinically stable at today's acute urgent care visit.  Patient appears nontoxic with no acute distress noted. Appropriate for outpatient care at this time. Shared decision-making was utilized with patient for treatment plan.  Medication discussed included indication for use and the potential benefits and side effects. Education was provided regarding the aforementioned assessments.  Differential Diagnosis, natural history, and supportive care discussed. All of the patient's questions were answered to their satisfaction at the time of discharge. Patient was encouraged to monitor symptoms closely. Those signs and symptoms which would warrant concern and mandate seeking a higher level of service through the emergency department discussed at length.  Patient stated agreement and understanding of this plan of care.    Disposition:  Home in stable condition.      Attestation       Voice Recognition Disclaimer:  Portions of this document were created using voice recognition software and [employer] technology provided by [employer]. The software does have a chance of producing errors of grammar and possibly content. I have made every reasonable attempt to correct obvious errors, but there may be errors of grammar and possibly content that I did not discover before finalizing the  documentation.    DENG Gay.

## 2024-11-20 NOTE — LETTER
November 20, 2024         Patient: Martín Ro   YOB: 1990   Date of Visit: 11/20/2024           To Whom it May Concern:    Martín Ro was seen in my clinic on 11/20/2024. He may return to school on 11/24/2024.    If you have any questions or concerns, please don't hesitate to call.        Sincerely,           DENG Gay.  Electronically Signed

## (undated) DEVICE — GLOVE BIOGEL PI ORTHO SZ 6 1/2 SURGICAL PF LF (40PR/BX)

## (undated) DEVICE — DRAPE STRLE REG TOWEL 18X24 - (10/BX 4BX/CA)"

## (undated) DEVICE — NEPTUNE 4 PORT MANIFOLD - (20/PK)

## (undated) DEVICE — CATHETER IV SAFETY 14 GA X 2 IN (200/CA)

## (undated) DEVICE — SUCTION INSTRUMENT YANKAUER BULBOUS TIP W/O VENT (50EA/CA)

## (undated) DEVICE — CORDS BIPOLAR COAGULATION - 12FT STERILE DISP. (10EA/BX)

## (undated) DEVICE — DISSECT TOOL MIDAS F2/8TA23

## (undated) DEVICE — GLOVE BIOGEL PI INDICATOR SZ 7.0 SURGICAL PF LF - (50/BX 4BX/CA)

## (undated) DEVICE — CANISTER SUCTION 3000ML MECHANICAL FILTER AUTO SHUTOFF MEDI-VAC NONSTERILE LF DISP  (40EA/CA)

## (undated) DEVICE — GLOVE SZ 7 BIOGEL PI MICRO - PF LF (50PR/BX 4BX/CA)

## (undated) DEVICE — SUTURE 0 VICRYL PLUS CT-2 - 8 X 18 INCH (12/BX)

## (undated) DEVICE — PACK CRANI - (1EA/CA)

## (undated) DEVICE — SURGIFOAM (SIZE 100) - (6EA/CA)

## (undated) DEVICE — SPONGE GAUZE STER 4X4 8-PL - (2/PK 50PK/BX 12BX/CS)

## (undated) DEVICE — PROTECTOR ULNA NERVE - (36PR/CA)

## (undated) DEVICE — KIT SURGIFLO W/OUT THROMBIN - (6EA/CA)

## (undated) DEVICE — SET EXTENSION WITH 2 PORTS (48EA/CA) ***PART #2C8610 IS A SUBSTITUTE*****

## (undated) DEVICE — LACTATED RINGERS INJ 1000 ML - (14EA/CA 60CA/PF)

## (undated) DEVICE — DRESSING NON-ADHERING 8 X 3 - (50/BX)

## (undated) DEVICE — PADDING CAST 4 IN STERILE - 4 X 4 YDS (24/CA)

## (undated) DEVICE — FORCEPS ELECTROSURGICAL DISPOSABLE CODMAN 8IN 1.5MM

## (undated) DEVICE — DRESSING XEROFORM 1X8 - (50/BX 4BX/CA)

## (undated) DEVICE — BLADE SURGICAL CLIPPER - (50EA/CA)

## (undated) DEVICE — SYRINGE 30 ML LL (56/BX)

## (undated) DEVICE — ELECTRODE DUAL RETURN W/ CORD - (50/PK)

## (undated) DEVICE — TUBING CLEARLINK DUO-VENT - C-FLO (48EA/CA)

## (undated) DEVICE — KIT ANESTHESIA W/CIRCUIT & 3/LT BAG W/FILTER (20EA/CA)

## (undated) DEVICE — BLADE CLIPPER FITS 2501 CLIPPER (BLUE)  (20EA/CA)

## (undated) DEVICE — ELECTRODE 850 FOAM ADHESIVE - HYDROGEL RADIOTRNSPRNT (50/PK)

## (undated) DEVICE — SPANDAGE SZ 6 ELASTIC NET - (25YD/CA)

## (undated) DEVICE — SUTURE GENERAL

## (undated) DEVICE — TRAY SURESTEP FOLEY TEMP SENSING 16FR (10EA/CA) ORDER  #18764 FOR TEMP FOLEY ONLY

## (undated) DEVICE — PATTIES SURG X-RAYCOTTONOID - 1/2 X 3 IN (200/CA)

## (undated) DEVICE — GLOVE BIOGEL SZ 6.5 SURGICAL PF LTX (50PR/BX 4BX/CA)

## (undated) DEVICE — LACTATED RINGERS INJ. 500 ML - (24EA/CA)

## (undated) DEVICE — GLOVE BIOGEL SZ 8.5 SURGICAL PF LTX - (50PR/BX 4BX/CA)

## (undated) DEVICE — CHLORAPREP 26 ML APPLICATOR - ORANGE TINT(25/CA)

## (undated) DEVICE — CORETEMP DRAPE FORM-FITTED EASY DROPANDGO DRAPE FOR USE ON THE CORETEMP FLUID MANAGEMENT 56IN X 56IN

## (undated) DEVICE — BOVIE BLADE COATED &INSULATED - 25/PK

## (undated) DEVICE — GOWN SURGICAL XX-LARGE - (28EA/CA) SIRUS NON REINFORCED

## (undated) DEVICE — MIDAS LUBRICATOR DIFFUSER PACK (4EA/CA)

## (undated) DEVICE — SLEEVE, VASO, THIGH, MED

## (undated) DEVICE — PERFORATER DISP TIP DGR-0

## (undated) DEVICE — SUTURE 4-0 NUROLON CR/8 TF - (12/BX) ETHICON

## (undated) DEVICE — GLOVE BIOGEL INDICATOR SZ 8.5 SURGICAL PF LTX - (50/BX 4BX/CA)

## (undated) DEVICE — SET LEADWIRE 5 LEAD BEDSIDE DISPOSABLE ECG (1SET OF 5/EA)

## (undated) DEVICE — TOWEL STOP TIMEOUT SAFETY FLAG (40EA/CA)

## (undated) DEVICE — GLOVE BIOGEL INDICATOR SZ 6.5 SURGICAL PF LTX - (50PR/BX 4BX/CA)

## (undated) DEVICE — GLOVE BIOGEL PI INDICATOR SZ 6.5 SURGICAL PF LF - (50/BX 4BX/CA)

## (undated) DEVICE — SODIUM CHL IRRIGATION 0.9% 1000ML (12EA/CA)

## (undated) DEVICE — TRAY CATHETER FOLEY URINE METER W/STATLOCK 350ML (10EA/CA)

## (undated) DEVICE — SCALP CLIP RANEY 20-1037 (10EA/PK 20PK/CA)

## (undated) DEVICE — HEMOSTAT SURG ABSORBABLE - 4 X 8 IN SURGICEL (24EA/CA)